# Patient Record
Sex: FEMALE | Race: WHITE | URBAN - METROPOLITAN AREA
[De-identification: names, ages, dates, MRNs, and addresses within clinical notes are randomized per-mention and may not be internally consistent; named-entity substitution may affect disease eponyms.]

---

## 2022-10-05 SDOH — HEALTH STABILITY: PHYSICAL HEALTH: ON AVERAGE, HOW MANY MINUTES DO YOU ENGAGE IN EXERCISE AT THIS LEVEL?: 10 MIN

## 2022-10-05 SDOH — HEALTH STABILITY: PHYSICAL HEALTH: ON AVERAGE, HOW MANY DAYS PER WEEK DO YOU ENGAGE IN MODERATE TO STRENUOUS EXERCISE (LIKE A BRISK WALK)?: 1 DAY

## 2022-10-05 ASSESSMENT — SOCIAL DETERMINANTS OF HEALTH (SDOH)
WITHIN THE LAST YEAR, HAVE YOU BEEN KICKED, HIT, SLAPPED, OR OTHERWISE PHYSICALLY HURT BY YOUR PARTNER OR EX-PARTNER?: NO
WITHIN THE LAST YEAR, HAVE YOU BEEN AFRAID OF YOUR PARTNER OR EX-PARTNER?: NO
WITHIN THE LAST YEAR, HAVE YOU BEEN HUMILIATED OR EMOTIONALLY ABUSED IN OTHER WAYS BY YOUR PARTNER OR EX-PARTNER?: NO
WITHIN THE LAST YEAR, HAVE TO BEEN RAPED OR FORCED TO HAVE ANY KIND OF SEXUAL ACTIVITY BY YOUR PARTNER OR EX-PARTNER?: NO

## 2022-10-06 ENCOUNTER — OFFICE VISIT (OUTPATIENT)
Dept: INTERNAL MEDICINE CLINIC | Age: 22
End: 2022-10-06
Payer: COMMERCIAL

## 2022-10-06 VITALS
DIASTOLIC BLOOD PRESSURE: 84 MMHG | HEART RATE: 92 BPM | WEIGHT: 138 LBS | BODY MASS INDEX: 25.4 KG/M2 | HEIGHT: 62 IN | SYSTOLIC BLOOD PRESSURE: 122 MMHG

## 2022-10-06 DIAGNOSIS — F90.0 ATTENTION DEFICIT HYPERACTIVITY DISORDER (ADHD), PREDOMINANTLY INATTENTIVE TYPE: ICD-10-CM

## 2022-10-06 DIAGNOSIS — F43.10 PTSD (POST-TRAUMATIC STRESS DISORDER): ICD-10-CM

## 2022-10-06 DIAGNOSIS — Z11.59 NEED FOR HEPATITIS C SCREENING TEST: ICD-10-CM

## 2022-10-06 DIAGNOSIS — J45.20 MILD INTERMITTENT ASTHMA WITHOUT COMPLICATION: ICD-10-CM

## 2022-10-06 DIAGNOSIS — R73.03 PREDIABETES: ICD-10-CM

## 2022-10-06 DIAGNOSIS — F43.23 ADJUSTMENT DISORDER WITH MIXED ANXIETY AND DEPRESSED MOOD: ICD-10-CM

## 2022-10-06 DIAGNOSIS — F84.5 ASPERGER'S DISORDER: ICD-10-CM

## 2022-10-06 DIAGNOSIS — Z53.1 REFUSAL OF BLOOD TRANSFUSIONS AS PATIENT IS JEHOVAH'S WITNESS: ICD-10-CM

## 2022-10-06 DIAGNOSIS — K90.9 MALABSORPTION OF IRON: ICD-10-CM

## 2022-10-06 DIAGNOSIS — F33.42 RECURRENT MAJOR DEPRESSIVE DISORDER, IN FULL REMISSION (HCC): ICD-10-CM

## 2022-10-06 DIAGNOSIS — D50.8 IRON DEFICIENCY ANEMIA SECONDARY TO INADEQUATE DIETARY IRON INTAKE: Primary | ICD-10-CM

## 2022-10-06 DIAGNOSIS — F84.0 AUTISM: ICD-10-CM

## 2022-10-06 DIAGNOSIS — F41.1 GENERALIZED ANXIETY DISORDER: ICD-10-CM

## 2022-10-06 DIAGNOSIS — Z11.4 SCREENING FOR HIV WITHOUT PRESENCE OF RISK FACTORS: ICD-10-CM

## 2022-10-06 PROBLEM — F32.9 MAJOR DEPRESSIVE DISORDER: Status: ACTIVE | Noted: 2022-10-06

## 2022-10-06 PROBLEM — J45.909 ASTHMA: Status: ACTIVE | Noted: 2022-10-06

## 2022-10-06 RX ORDER — ALBUTEROL SULFATE 90 UG/1
AEROSOL, METERED RESPIRATORY (INHALATION)
COMMUNITY

## 2022-10-06 RX ORDER — METHYLPHENIDATE HYDROCHLORIDE 18 MG/1
TABLET ORAL
COMMUNITY

## 2022-10-06 RX ORDER — BUSPIRONE HYDROCHLORIDE 15 MG/1
TABLET ORAL
COMMUNITY

## 2022-10-06 RX ORDER — VENLAFAXINE HYDROCHLORIDE 150 MG/1
CAPSULE, EXTENDED RELEASE ORAL
COMMUNITY

## 2022-10-06 RX ORDER — METHYLPHENIDATE HYDROCHLORIDE 10 MG/1
TABLET ORAL
COMMUNITY

## 2022-10-06 RX ORDER — OMEPRAZOLE 20 MG/1
CAPSULE, DELAYED RELEASE ORAL
COMMUNITY

## 2022-10-06 RX ORDER — VENLAFAXINE HYDROCHLORIDE 37.5 MG/1
TABLET, EXTENDED RELEASE ORAL
COMMUNITY

## 2022-10-06 SDOH — ECONOMIC STABILITY: FOOD INSECURITY: WITHIN THE PAST 12 MONTHS, YOU WORRIED THAT YOUR FOOD WOULD RUN OUT BEFORE YOU GOT MONEY TO BUY MORE.: NEVER TRUE

## 2022-10-06 SDOH — ECONOMIC STABILITY: FOOD INSECURITY: WITHIN THE PAST 12 MONTHS, THE FOOD YOU BOUGHT JUST DIDN'T LAST AND YOU DIDN'T HAVE MONEY TO GET MORE.: NEVER TRUE

## 2022-10-06 ASSESSMENT — ENCOUNTER SYMPTOMS
WHEEZING: 0
SHORTNESS OF BREATH: 0
COUGH: 0
CHEST TIGHTNESS: 0

## 2022-10-06 ASSESSMENT — PATIENT HEALTH QUESTIONNAIRE - PHQ9
8. MOVING OR SPEAKING SO SLOWLY THAT OTHER PEOPLE COULD HAVE NOTICED. OR THE OPPOSITE, BEING SO FIGETY OR RESTLESS THAT YOU HAVE BEEN MOVING AROUND A LOT MORE THAN USUAL: 1
7. TROUBLE CONCENTRATING ON THINGS, SUCH AS READING THE NEWSPAPER OR WATCHING TELEVISION: 2
4. FEELING TIRED OR HAVING LITTLE ENERGY: 3
2. FEELING DOWN, DEPRESSED OR HOPELESS: 2
6. FEELING BAD ABOUT YOURSELF - OR THAT YOU ARE A FAILURE OR HAVE LET YOURSELF OR YOUR FAMILY DOWN: 1
5. POOR APPETITE OR OVEREATING: 2
SUM OF ALL RESPONSES TO PHQ QUESTIONS 1-9: 15
SUM OF ALL RESPONSES TO PHQ9 QUESTIONS 1 & 2: 3
3. TROUBLE FALLING OR STAYING ASLEEP: 2
9. THOUGHTS THAT YOU WOULD BE BETTER OFF DEAD, OR OF HURTING YOURSELF: 1
SUM OF ALL RESPONSES TO PHQ QUESTIONS 1-9: 14
1. LITTLE INTEREST OR PLEASURE IN DOING THINGS: 1
SUM OF ALL RESPONSES TO PHQ QUESTIONS 1-9: 15
SUM OF ALL RESPONSES TO PHQ QUESTIONS 1-9: 15

## 2022-10-06 ASSESSMENT — SOCIAL DETERMINANTS OF HEALTH (SDOH): HOW HARD IS IT FOR YOU TO PAY FOR THE VERY BASICS LIKE FOOD, HOUSING, MEDICAL CARE, AND HEATING?: NOT HARD AT ALL
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
## 2022-10-07 DIAGNOSIS — K90.9 MALABSORPTION OF IRON: ICD-10-CM

## 2022-10-07 DIAGNOSIS — Z11.4 SCREENING FOR HIV WITHOUT PRESENCE OF RISK FACTORS: ICD-10-CM

## 2022-10-07 DIAGNOSIS — R73.03 PREDIABETES: ICD-10-CM

## 2022-10-07 DIAGNOSIS — Z11.59 NEED FOR HEPATITIS C SCREENING TEST: ICD-10-CM

## 2022-10-07 DIAGNOSIS — F41.1 GENERALIZED ANXIETY DISORDER: ICD-10-CM

## 2022-10-07 DIAGNOSIS — D50.8 IRON DEFICIENCY ANEMIA SECONDARY TO INADEQUATE DIETARY IRON INTAKE: ICD-10-CM

## 2022-10-07 LAB
ANION GAP SERPL CALCULATED.3IONS-SCNC: 11 MMOL/L (ref 3–16)
BUN BLDV-MCNC: 5 MG/DL (ref 7–20)
CALCIUM SERPL-MCNC: 9.7 MG/DL (ref 8.3–10.6)
CHLORIDE BLD-SCNC: 102 MMOL/L (ref 99–110)
CO2: 26 MMOL/L (ref 21–32)
CREAT SERPL-MCNC: 0.6 MG/DL (ref 0.6–1.1)
FERRITIN: 18.2 NG/ML (ref 15–150)
FOLATE: >20 NG/ML (ref 4.78–24.2)
GFR AFRICAN AMERICAN: >60
GFR NON-AFRICAN AMERICAN: >60
GLUCOSE BLD-MCNC: 90 MG/DL (ref 70–99)
HCT VFR BLD CALC: 43.4 % (ref 36–48)
HEMOGLOBIN: 14.3 G/DL (ref 12–16)
HEPATITIS C ANTIBODY INTERPRETATION: NORMAL
IRON SATURATION: 39 % (ref 15–50)
IRON: 131 UG/DL (ref 37–145)
MCH RBC QN AUTO: 29.6 PG (ref 26–34)
MCHC RBC AUTO-ENTMCNC: 32.9 G/DL (ref 31–36)
MCV RBC AUTO: 90.1 FL (ref 80–100)
PDW BLD-RTO: 13.5 % (ref 12.4–15.4)
PLATELET # BLD: 292 K/UL (ref 135–450)
PMV BLD AUTO: 8 FL (ref 5–10.5)
POTASSIUM SERPL-SCNC: 4 MMOL/L (ref 3.5–5.1)
RBC # BLD: 4.81 M/UL (ref 4–5.2)
SODIUM BLD-SCNC: 139 MMOL/L (ref 136–145)
TOTAL IRON BINDING CAPACITY: 335 UG/DL (ref 260–445)
TSH REFLEX FT4: 1.64 UIU/ML (ref 0.27–4.2)
VITAMIN B-12: 431 PG/ML (ref 211–911)
WBC # BLD: 4.8 K/UL (ref 4–11)

## 2022-10-08 LAB
ESTIMATED AVERAGE GLUCOSE: 111.2 MG/DL
HBA1C MFR BLD: 5.5 %
HIV AG/AB: NORMAL
HIV ANTIGEN: NORMAL
HIV-1 ANTIBODY: NORMAL
HIV-2 AB: NORMAL

## 2023-01-03 ENCOUNTER — HOSPITAL ENCOUNTER (EMERGENCY)
Age: 23
Discharge: HOME | End: 2023-01-03
Attending: EMERGENCY MEDICINE
Payer: COMMERCIAL

## 2023-01-03 ENCOUNTER — TELEPHONE (OUTPATIENT)
Dept: INTERNAL MEDICINE CLINIC | Age: 23
End: 2023-01-03

## 2023-01-03 VITALS
OXYGEN SATURATION: 99 % | WEIGHT: 138 LBS | TEMPERATURE: 98.8 F | HEART RATE: 96 BPM | SYSTOLIC BLOOD PRESSURE: 137 MMHG | HEIGHT: 62 IN | BODY MASS INDEX: 25.4 KG/M2 | DIASTOLIC BLOOD PRESSURE: 99 MMHG | RESPIRATION RATE: 16 BRPM

## 2023-01-03 DIAGNOSIS — R07.89 CHEST WALL PAIN: Primary | ICD-10-CM

## 2023-01-03 PROCEDURE — 93005 ELECTROCARDIOGRAM TRACING: CPT | Performed by: EMERGENCY MEDICINE

## 2023-01-03 PROCEDURE — 99283 EMERGENCY DEPT VISIT LOW MDM: CPT

## 2023-01-03 RX ORDER — CYCLOBENZAPRINE HCL 5 MG
5 TABLET ORAL 3 TIMES DAILY PRN
Qty: 30 TABLET | Refills: 0 | Status: SHIPPED | OUTPATIENT
Start: 2023-01-03 | End: 2023-01-13

## 2023-01-03 RX ORDER — IBUPROFEN 400 MG/1
400 TABLET ORAL EVERY 6 HOURS PRN
Qty: 30 TABLET | Refills: 0 | Status: SHIPPED | OUTPATIENT
Start: 2023-01-03

## 2023-01-03 ASSESSMENT — PAIN DESCRIPTION - LOCATION: LOCATION: CHEST

## 2023-01-03 ASSESSMENT — PAIN SCALES - GENERAL: PAINLEVEL_OUTOF10: 4

## 2023-01-03 ASSESSMENT — PAIN DESCRIPTION - PAIN TYPE: TYPE: ACUTE PAIN

## 2023-01-03 ASSESSMENT — PAIN - FUNCTIONAL ASSESSMENT: PAIN_FUNCTIONAL_ASSESSMENT: 0-10

## 2023-01-03 NOTE — TELEPHONE ENCOUNTER
Pt calling in because she woke up this morning with extreme chest pain, if she moves anything with her shoulders & further up it makes the pain worse. Feels better when sitting. Slight degree of pain of 5 when sitting & breathing. When moving around or taking a deep breath pain of degree feels like a 7. Advised pcp was not here & she should go to her nearest er or urgent.

## 2023-01-03 NOTE — ED PROVIDER NOTES
[facility]        Pt Name: Brandin Garcia  MRN: [id number]  [place] [address]  Date of evaluation: 1/3/2023  Provider: Vivienne Ayoub MD  PCP: DELIO Velázquez CNP  Note Started: 1:09 PM EST 1/3/23    CHIEF COMPLAINT       Chief Complaint   Patient presents with    Chest Pain     Patient sts she woke up with chest pain this morning up to shoulders. Hurts to breathe. Also endorses that her dog kicked in her chest yesterday. HISTORY OF PRESENT ILLNESS: 1 or more Elements     History from : Patient    Limitations to history : autism    Brandin Garcia is a 25 y.o. female who presents for chest pain. Patient reports that she has had chest pain since this morning. She states that her dog kicked her in the chest yesterday and scratched her chest and she did not feel pain until this morning. In response to her dog kicking her in the chest she however slept differently overnight making a nest for herself out of blankets and pillows and believes that this different sleeping because the chest pain. She denies any other complaints. No fevers, chills or sweats. She denies all PERC and cardiac risk factors    Nursing Notes were all reviewed and agreed with or any disagreements were addressed in the HPI. REVIEW OF SYSTEMS :      Review of Systems    Positives and Pertinent negatives as per HPI.      SURGICAL HISTORY     Past Surgical History:   Procedure Laterality Date    TOOTH EXTRACTION         CURRENTMEDICATIONS       Discharge Medication List as of 1/3/2023 12:53 PM        CONTINUE these medications which have NOT CHANGED    Details   albuterol sulfate HFA (PROVENTIL;VENTOLIN;PROAIR) 108 (90 Base) MCG/ACT inhaler albuterol sulfate HFA 90 mcg/actuation aerosol inhalerHistorical Med      busPIRone (BUSPAR) 15 MG tablet buspirone 15 mg tablet   TAKE 1 TABLET BY MOUTH EVERY DAYHistorical Med      methylphenidate (RITALIN) 10 MG tablet methylphenidate 10 mg tablet   TAKE 1 TABLET BY MOUTH ONCE A DAY WITH MEALS AT NOONHistorical Med      methylphenidate (CONCERTA) 18 MG extended release tablet methylphenidate ER 18 mg tablet,extended release 24 hr   TAKE 1 TABLET BY MOUTH EVERY MORNING WITH A MEALHistorical Med      omeprazole (PRILOSEC) 20 MG delayed release capsule omeprazole 20 mg capsule,delayed release   TAKE 1 CAPSULE BY MOUTH EVERY DAYHistorical Med      venlafaxine (EFFEXOR XR) 150 MG extended release capsule venlafaxine  mg capsule,extended release 24 hr   TAKE 1 CAPSULE BY MOUTH EVERY DAYHistorical Med      venlafaxine 37.5 MG extended release tablet venlafaxine ER 37.5 mg tablet,extended release 24 hr   TAKE 1 TABLET BY MOUTH ONCE A DAY WITH 150 MG TABLETHistorical Med      diphenhydrAMINE HCl (ALLERGY MED PO) Take by mouthHistorical Med      Multiple Vitamins-Minerals (WOMENS MULTIVITAMIN PO) Take by mouth Shakee womens with ironHistorical Med      ELDERBERRY PO Take by mouthHistorical Med             ALLERGIES     Pecan pollen, Citalopram, Other, and Amoxicillin    FAMILYHISTORY     No family history on file. SOCIAL HISTORY       Social History     Tobacco Use    Smoking status: Never    Smokeless tobacco: Never   Vaping Use    Vaping Use: Never used   Substance Use Topics    Alcohol use: Not Currently     Alcohol/week: 0.0 - 2.0 standard drinks     Comment: occassionally, 0-6 per month    Drug use: Never       SCREENINGS        Tory Coma Scale  Eye Opening: Spontaneous  Best Verbal Response: Oriented  Best Motor Response: Obeys commands  Correll Coma Scale Score: 15                CIWA Assessment  BP: (!) 137/99  Heart Rate: 96           PHYSICAL EXAM  1 or more Elements     ED Triage Vitals [01/03/23 1235]   BP Temp Temp src Heart Rate Resp SpO2 Height Weight   (!) 137/99 98.8 °F (37.1 °C) -- 96 16 99 % 5' 2\" (1.575 m) 138 lb (62.6 kg)       Physical Exam    On exam patient's anterior chest wall is tender.   No bruising noted.  Heart regular rate and rhythm and lungs clear to auscultation bilaterally. Abdomen benign. No significant peripheral edema, patient is awake and alert and oriented x3. Does not maintain eye contact. DIAGNOSTIC RESULTS   LABS:    Labs Reviewed - No data to display    When ordered only abnormal lab results are displayed. All other labs were within normal range or not returned as of this dictation. EKG: EKG  The Ekg interpreted by me shows  normal sinus rhythm with a rate of 90  Axis is   Right axis deviation  QTc is  normal  Intervals and Durations are unremarkable. ST Segments: normal  No prior EKG available for comparison. PAST MEDICAL HISTORY      has a past medical history of Cardiac syncope (8/27/2014). EMERGENCY DEPARTMENT COURSE and DIFFERENTIAL DIAGNOSIS/MDM:   Vitals:    Vitals:    01/03/23 1235   BP: (!) 137/99   Pulse: 96   Resp: 16   Temp: 98.8 °F (37.1 °C)   SpO2: 99%   Weight: 138 lb (62.6 kg)   Height: 5' 2\" (1.575 m)           Social Determinants : autism    Records Reviewed : None    CC/HPI Summary, DDx, ED Course, and Reassessment: Based on the patient's story I do not believe that she requires any further evaluation. Her pain is musculoskeletal both on exam and by history. ACS, PE and thoracic aortic dissection have all been ruled out in my opinion based on the exam and history. Patient is PERC criteria negative. Disposition Considerations (tests considered but not done, Shared Decision Making, Pt Expectation of Test or Tx.): We considered imaging and we are in agreement that she does not need this based on strong opinion that this is not needed. Patient is agreeable with plan. I am the Primary Clinician of Record. FINAL IMPRESSION      1.  Chest wall pain          DISPOSITION/PLAN     DISPOSITION Decision To Discharge 01/03/2023 12:48:13 PM      PATIENT REFERRED TO:  Deven Zapata, DELIO - CNP  [address] [address]  [phone]    Schedule an appointment as soon as possible for a visit in 2 days        DISCHARGE MEDICATIONS:  Discharge Medication List as of 1/3/2023 12:53 PM        START taking these medications    Details   ibuprofen (ADVIL;MOTRIN) 400 MG tablet Take 1 tablet by mouth every 6 hours as needed for Pain, Disp-30 tablet, R-0Normal      cyclobenzaprine (FLEXERIL) 5 MG tablet Take 1 tablet by mouth 3 times daily as needed for Muscle spasms, Disp-30 tablet, R-0Normal             DISCONTINUED MEDICATIONS:  Discharge Medication List as of 1/3/2023 12:53 PM                 (Please note that portions of this note were completed with a voice recognition program.  Efforts were made to edit the dictations but occasionally words are mis-transcribed.)    Troy Ferrer MD (electronically signed)           Troy Ferrer MD  01/03/23 2063

## 2023-01-05 LAB
EKG ATRIAL RATE: 90 BPM
EKG DIAGNOSIS: NORMAL
EKG P-R INTERVAL: 144 MS
EKG Q-T INTERVAL: 330 MS
EKG QRS DURATION: 82 MS
EKG QTC CALCULATION (BAZETT): 403 MS
EKG R AXIS: 113 DEGREES
EKG T AXIS: 154 DEGREES
EKG VENTRICULAR RATE: 90 BPM

## 2023-01-16 ENCOUNTER — OFFICE VISIT (OUTPATIENT)
Dept: INTERNAL MEDICINE CLINIC | Age: 23
End: 2023-01-16
Payer: COMMERCIAL

## 2023-01-16 VITALS
DIASTOLIC BLOOD PRESSURE: 78 MMHG | WEIGHT: 146 LBS | HEART RATE: 99 BPM | OXYGEN SATURATION: 99 % | BODY MASS INDEX: 26.7 KG/M2 | SYSTOLIC BLOOD PRESSURE: 130 MMHG

## 2023-01-16 DIAGNOSIS — F43.23 ADJUSTMENT DISORDER WITH MIXED ANXIETY AND DEPRESSED MOOD: ICD-10-CM

## 2023-01-16 DIAGNOSIS — F41.1 GENERALIZED ANXIETY DISORDER: ICD-10-CM

## 2023-01-16 DIAGNOSIS — R07.89 CHEST WALL PAIN: Primary | ICD-10-CM

## 2023-01-16 DIAGNOSIS — F90.0 ATTENTION DEFICIT HYPERACTIVITY DISORDER (ADHD), PREDOMINANTLY INATTENTIVE TYPE: ICD-10-CM

## 2023-01-16 DIAGNOSIS — S29.012D STRAIN OF RHOMBOID MUSCLE, SUBSEQUENT ENCOUNTER: ICD-10-CM

## 2023-01-16 DIAGNOSIS — F43.10 PTSD (POST-TRAUMATIC STRESS DISORDER): ICD-10-CM

## 2023-01-16 DIAGNOSIS — F84.5 ASPERGER'S DISORDER: ICD-10-CM

## 2023-01-16 DIAGNOSIS — F33.42 RECURRENT MAJOR DEPRESSIVE DISORDER, IN FULL REMISSION (HCC): ICD-10-CM

## 2023-01-16 DIAGNOSIS — F84.0 AUTISM: ICD-10-CM

## 2023-01-16 PROCEDURE — G8419 CALC BMI OUT NRM PARAM NOF/U: HCPCS | Performed by: NURSE PRACTITIONER

## 2023-01-16 PROCEDURE — 1036F TOBACCO NON-USER: CPT | Performed by: NURSE PRACTITIONER

## 2023-01-16 PROCEDURE — 99214 OFFICE O/P EST MOD 30 MIN: CPT | Performed by: NURSE PRACTITIONER

## 2023-01-16 PROCEDURE — G8484 FLU IMMUNIZE NO ADMIN: HCPCS | Performed by: NURSE PRACTITIONER

## 2023-01-16 PROCEDURE — G8427 DOCREV CUR MEDS BY ELIG CLIN: HCPCS | Performed by: NURSE PRACTITIONER

## 2023-01-16 ASSESSMENT — PATIENT HEALTH QUESTIONNAIRE - PHQ9
SUM OF ALL RESPONSES TO PHQ9 QUESTIONS 1 & 2: 2
10. IF YOU CHECKED OFF ANY PROBLEMS, HOW DIFFICULT HAVE THESE PROBLEMS MADE IT FOR YOU TO DO YOUR WORK, TAKE CARE OF THINGS AT HOME, OR GET ALONG WITH OTHER PEOPLE: 1
6. FEELING BAD ABOUT YOURSELF - OR THAT YOU ARE A FAILURE OR HAVE LET YOURSELF OR YOUR FAMILY DOWN: 1
9. THOUGHTS THAT YOU WOULD BE BETTER OFF DEAD, OR OF HURTING YOURSELF: 0
4. FEELING TIRED OR HAVING LITTLE ENERGY: 3
SUM OF ALL RESPONSES TO PHQ QUESTIONS 1-9: 12
1. LITTLE INTEREST OR PLEASURE IN DOING THINGS: 1
7. TROUBLE CONCENTRATING ON THINGS, SUCH AS READING THE NEWSPAPER OR WATCHING TELEVISION: 2
SUM OF ALL RESPONSES TO PHQ QUESTIONS 1-9: 12
3. TROUBLE FALLING OR STAYING ASLEEP: 2
2. FEELING DOWN, DEPRESSED OR HOPELESS: 1
8. MOVING OR SPEAKING SO SLOWLY THAT OTHER PEOPLE COULD HAVE NOTICED. OR THE OPPOSITE, BEING SO FIGETY OR RESTLESS THAT YOU HAVE BEEN MOVING AROUND A LOT MORE THAN USUAL: 1
5. POOR APPETITE OR OVEREATING: 1
SUM OF ALL RESPONSES TO PHQ QUESTIONS 1-9: 12
SUM OF ALL RESPONSES TO PHQ QUESTIONS 1-9: 12

## 2023-01-16 ASSESSMENT — ENCOUNTER SYMPTOMS
CHEST TIGHTNESS: 0
WHEEZING: 0
COUGH: 0
SHORTNESS OF BREATH: 0

## 2023-01-16 NOTE — PROGRESS NOTES
1/16/23     Chief Complaint   Patient presents with    ED Follow-up     [facility] 1/3/23 for Chest pain. Has improved. HPI    Here today for ED follow up from 1/3/2023. States she had a chest wall pain the day of 1/3/23. Of note dog kicked her in chest that day more in the right clavicle. She states she made a nest in her room that also made her sleep in an awkward position. The pain she describes was positional. She states it was hard to take a deep breath with this pain and she couldn't move. She denies feeling faint, nausea, cold sweat, jaw pain, or radiation of pain with the previous episode. She went to the ER with mom and she states in the waiting room the pain had already started to subside. The ED did an EKG that was WNL. She states she has not had any syncope episodes since 2014, she was evaluated by a one time visit to [facility], where an ECHO was performed but results were WNL. She states they told her it was mostly dehydration. She is not currently followed by cardiology. She states the pain has not returned and things have been normal since ED visit. She does state she has some other associated pains in her neck and shoulder blade which are positional when she turns her head to the right. She states she googled her pain symptoms and wants to know if it is a pinched nerve. Was sent home from the ED with ibuprofen and a muscle relaxer? ? (Flexeril) . She has used these medications when the pain is worse and she says they do help. Nothing seems to make the pain worse. She states all other aspects of her health are without concern. ADHD and Anxiety continues to be well controlled. She is starting a new job today at [employer].          Allergies   Allergen Reactions    Pecan Pollen Shortness Of Breath    Citalopram Other (See Comments)    Other      Other reaction(s): Tested positive  Pecan tree and hickory tree pollen    Amoxicillin Hives and Rash     Current Outpatient Medications   Medication Sig Dispense Refill    ibuprofen (ADVIL;MOTRIN) 400 MG tablet Take 1 tablet by mouth every 6 hours as needed for Pain 30 tablet 0    albuterol sulfate HFA (PROVENTIL;VENTOLIN;PROAIR) 108 (90 Base) MCG/ACT inhaler albuterol sulfate HFA 90 mcg/actuation aerosol inhaler      busPIRone (BUSPAR) 15 MG tablet Take 30 mg by mouth      methylphenidate (RITALIN) 10 MG tablet methylphenidate 10 mg tablet   TAKE 1 TABLET BY MOUTH ONCE A DAY WITH MEALS AT NOON      methylphenidate (CONCERTA) 18 MG extended release tablet methylphenidate ER 18 mg tablet,extended release 24 hr   TAKE 1 TABLET BY MOUTH EVERY MORNING WITH A MEAL      omeprazole (PRILOSEC) 20 MG delayed release capsule omeprazole 20 mg capsule,delayed release   TAKE 1 CAPSULE BY MOUTH EVERY DAY      venlafaxine (EFFEXOR XR) 150 MG extended release capsule venlafaxine  mg capsule,extended release 24 hr   TAKE 1 CAPSULE BY MOUTH EVERY DAY      venlafaxine 37.5 MG extended release tablet Take 250 mg by mouth daily (with breakfast)      diphenhydrAMINE HCl (ALLERGY MED PO) Take by mouth      Multiple Vitamins-Minerals (WOMENS MULTIVITAMIN PO) Take by mouth Shakee womens with iron      ELDERBERRY PO Take by mouth       No current facility-administered medications for this visit. Review of Systems   Constitutional:  Negative for chills, fatigue and fever. Respiratory:  Negative for cough, chest tightness, shortness of breath and wheezing. Cardiovascular:  Negative for chest pain, palpitations and leg swelling. Neurological:  Negative for dizziness, tremors, light-headedness and headaches. Vitals:    01/16/23 0812   BP: 130/78   Pulse: 99   SpO2: 99%   Weight: 146 lb (66.2 kg)      Physical Exam  Constitutional:       General: She is not in acute distress. Appearance: Normal appearance. She is not ill-appearing. HENT:      Head: Normocephalic and atraumatic. Pulmonary:      Effort: Pulmonary effort is normal. No respiratory distress. Neurological:      General: No focal deficit present. Mental Status: She is alert and oriented to person, place, and time. Mental status is at baseline. Psychiatric:         Mood and Affect: Mood normal.         Behavior: Behavior normal.     Assessment/Plan:  Chest wall pain/ rhomboid strain   MSK in nature. Chest wall pain has resolved. Mild and intermittent rhomboid strain. Continue current therapy with Ibuprofen prn if needed   Start at home stretches - provided written instructions     Generalized anxiety disorder/Autism/Asperger's disorder/Adjustment disorder with mixed anxiety and depressed mood/Recurrent major depressive disorder, in full remission (HCC)/ Attention deficit hyperactivity disorder (ADHD), predominantly inattentive type  Chronic stable controlled   Continue current medications, and therapies     Discussed medications with patient, who voiced understanding of their use and indications. All questions answered. Return in about 6 months (around 7/16/2023), or if symptoms worsen or fail to improve.       Electronically signed by DELIO Medina CNP on 1/16/2023 at 9:57 AM

## 2023-01-31 ENCOUNTER — OFFICE VISIT (OUTPATIENT)
Dept: INTERNAL MEDICINE CLINIC | Age: 23
End: 2023-01-31
Payer: COMMERCIAL

## 2023-01-31 VITALS
HEIGHT: 62 IN | WEIGHT: 147.2 LBS | BODY MASS INDEX: 27.09 KG/M2 | HEART RATE: 119 BPM | OXYGEN SATURATION: 99 % | SYSTOLIC BLOOD PRESSURE: 128 MMHG | DIASTOLIC BLOOD PRESSURE: 80 MMHG

## 2023-01-31 DIAGNOSIS — D24.1 FIBROADENOMA OF BREAST, RIGHT: ICD-10-CM

## 2023-01-31 DIAGNOSIS — N63.0 BREAST LUMP IN UPPER INNER QUADRANT: Primary | ICD-10-CM

## 2023-01-31 PROCEDURE — G8419 CALC BMI OUT NRM PARAM NOF/U: HCPCS | Performed by: INTERNAL MEDICINE

## 2023-01-31 PROCEDURE — G8427 DOCREV CUR MEDS BY ELIG CLIN: HCPCS | Performed by: INTERNAL MEDICINE

## 2023-01-31 PROCEDURE — 1036F TOBACCO NON-USER: CPT | Performed by: INTERNAL MEDICINE

## 2023-01-31 PROCEDURE — G8484 FLU IMMUNIZE NO ADMIN: HCPCS | Performed by: INTERNAL MEDICINE

## 2023-01-31 PROCEDURE — 99213 OFFICE O/P EST LOW 20 MIN: CPT | Performed by: INTERNAL MEDICINE

## 2023-01-31 ASSESSMENT — ENCOUNTER SYMPTOMS
CHEST TIGHTNESS: 0
SORE THROAT: 0
COUGH: 0
VOMITING: 0
BACK PAIN: 0
WHEEZING: 0
ABDOMINAL PAIN: 0
COLOR CHANGE: 0
NAUSEA: 0
CONSTIPATION: 0
SHORTNESS OF BREATH: 0

## 2023-01-31 NOTE — PROGRESS NOTES
ASSESSMENT/PLAN:  1. Breast lump in upper inner quadrant  Assessment & Plan:   Patient reassured lump highly suggestive and consistent of benign fibroadenoma over the breast, she is very anxious about the findings, explained benign nature of lesion, advised will check ultrasound to see if this is a cyst versus fibroadenoma, advised if radiology request mammogram  then will place order however given young age high breast tissue density will make mammogram unuseful. Further recommendations will be pending imaging studies. Can use as needed Tylenol for breast discomfort  Orders:  -     US BREAST LIMITED RIGHT; Future  2. Fibroadenoma of breast, right  -     US BREAST LIMITED RIGHT; Future    No follow-ups on file. SUBJECTIVE  HPI:   Patient seen for acute illness, accompanied with mother, she is very anxious after noticing a lump in her right breast 3 days ago. She started her period today. The lump cannot be tender with deep palpation but generally nonpainful. He is very concerned because she has family history of breast cancer with her paternal aunt just finished chemotherapy and another relative on father side with past history of breast cancer      Review of Systems   Constitutional:  Negative for activity change, appetite change and fatigue. HENT:  Negative for congestion, hearing loss, mouth sores and sore throat. Respiratory:  Negative for cough, chest tightness, shortness of breath and wheezing. Cardiovascular:  Negative for chest pain, palpitations and leg swelling. Gastrointestinal:  Negative for abdominal pain, constipation, nausea and vomiting. Genitourinary:  Negative for difficulty urinating, dysuria, frequency, hematuria and urgency. Musculoskeletal:  Negative for arthralgias, back pain, gait problem and joint swelling. Skin:  Negative for color change. Allergic/Immunologic: Negative for environmental allergies and immunocompromised state.    Neurological:  Negative for dizziness, light-headedness and headaches. Psychiatric/Behavioral:  Negative for behavioral problems and dysphoric mood. The patient is nervous/anxious. OBJECTIVE:    /80   Pulse (!) 119   Ht 5' 2\" (1.575 m)   Wt 147 lb 3.2 oz (66.8 kg)   SpO2 99%   BMI 26.92 kg/m²    Physical Exam  Vitals and nursing note reviewed. Constitutional:       General: She is not in acute distress. Appearance: She is normal weight. She is not toxic-appearing. HENT:      Head: Normocephalic. Chest:   Breasts:     Right: Mass present. No swelling, bleeding, inverted nipple, nipple discharge, skin change or tenderness. Left: No swelling, bleeding, inverted nipple, mass, nipple discharge, skin change or tenderness. Comments: Round marble size mobile lump felt rupper inner quadrant of right breast  Musculoskeletal:      Cervical back: Neck supple. Lymphadenopathy:      Cervical: No cervical adenopathy. Neurological:      Mental Status: She is alert. Electronically signed by Rosalba Serrato MD on 1/31/2023 at 4:09 PM.    This dictation was generated by voice recognition computer software. Although all attempts are made to edit the dictation for accuracy, there may be errors in the transcription that are not intended.

## 2023-01-31 NOTE — ASSESSMENT & PLAN NOTE
Patient reassured lump highly suggestive and consistent of benign fibroadenoma over the breast, she is very anxious about the findings, explained benign nature of lesion, advised will check ultrasound to see if this is a cyst versus fibroadenoma, advised if radiology request mammogram  then will place order however given young age high breast tissue density will make mammogram unuseful. Further recommendations will be pending imaging studies.   Can use as needed Tylenol for breast discomfort

## 2023-02-15 ENCOUNTER — HOSPITAL ENCOUNTER (OUTPATIENT)
Dept: ULTRASOUND IMAGING | Age: 23
Discharge: HOME | End: 2023-02-15
Payer: COMMERCIAL

## 2023-02-15 DIAGNOSIS — D24.1 FIBROADENOMA OF BREAST, RIGHT: ICD-10-CM

## 2023-02-15 DIAGNOSIS — N63.0 BREAST LUMP IN UPPER INNER QUADRANT: ICD-10-CM

## 2023-02-15 PROCEDURE — 76642 ULTRASOUND BREAST LIMITED: CPT

## 2023-03-28 ENCOUNTER — OFFICE VISIT (OUTPATIENT)
Dept: INTERNAL MEDICINE CLINIC | Age: 23
End: 2023-03-28
Payer: COMMERCIAL

## 2023-03-28 VITALS
HEIGHT: 62 IN | SYSTOLIC BLOOD PRESSURE: 120 MMHG | HEART RATE: 97 BPM | DIASTOLIC BLOOD PRESSURE: 76 MMHG | OXYGEN SATURATION: 98 % | BODY MASS INDEX: 27.68 KG/M2 | WEIGHT: 150.4 LBS

## 2023-03-28 DIAGNOSIS — H83.02 LABYRINTHITIS OF LEFT EAR: Primary | ICD-10-CM

## 2023-03-28 PROCEDURE — G8419 CALC BMI OUT NRM PARAM NOF/U: HCPCS | Performed by: INTERNAL MEDICINE

## 2023-03-28 PROCEDURE — 99213 OFFICE O/P EST LOW 20 MIN: CPT | Performed by: INTERNAL MEDICINE

## 2023-03-28 PROCEDURE — G8427 DOCREV CUR MEDS BY ELIG CLIN: HCPCS | Performed by: INTERNAL MEDICINE

## 2023-03-28 PROCEDURE — G8484 FLU IMMUNIZE NO ADMIN: HCPCS | Performed by: INTERNAL MEDICINE

## 2023-03-28 PROCEDURE — 1036F TOBACCO NON-USER: CPT | Performed by: INTERNAL MEDICINE

## 2023-03-28 SDOH — ECONOMIC STABILITY: FOOD INSECURITY: WITHIN THE PAST 12 MONTHS, THE FOOD YOU BOUGHT JUST DIDN'T LAST AND YOU DIDN'T HAVE MONEY TO GET MORE.: NEVER TRUE

## 2023-03-28 SDOH — ECONOMIC STABILITY: INCOME INSECURITY: HOW HARD IS IT FOR YOU TO PAY FOR THE VERY BASICS LIKE FOOD, HOUSING, MEDICAL CARE, AND HEATING?: NOT HARD AT ALL

## 2023-03-28 SDOH — ECONOMIC STABILITY: FOOD INSECURITY: WITHIN THE PAST 12 MONTHS, YOU WORRIED THAT YOUR FOOD WOULD RUN OUT BEFORE YOU GOT MONEY TO BUY MORE.: NEVER TRUE

## 2023-03-28 SDOH — ECONOMIC STABILITY: HOUSING INSECURITY
IN THE LAST 12 MONTHS, WAS THERE A TIME WHEN YOU DID NOT HAVE A STEADY PLACE TO SLEEP OR SLEPT IN A SHELTER (INCLUDING NOW)?: NO

## 2023-03-28 ASSESSMENT — ENCOUNTER SYMPTOMS
ABDOMINAL PAIN: 0
COUGH: 0
SWOLLEN GLANDS: 0
SORE THROAT: 0
VOMITING: 0
VISUAL CHANGE: 1

## 2023-07-17 ENCOUNTER — OFFICE VISIT (OUTPATIENT)
Dept: INTERNAL MEDICINE CLINIC | Age: 23
End: 2023-07-17
Payer: COMMERCIAL

## 2023-07-17 VITALS
OXYGEN SATURATION: 96 % | WEIGHT: 158 LBS | HEART RATE: 58 BPM | SYSTOLIC BLOOD PRESSURE: 114 MMHG | BODY MASS INDEX: 28.9 KG/M2 | DIASTOLIC BLOOD PRESSURE: 64 MMHG

## 2023-07-17 DIAGNOSIS — J45.20 MILD INTERMITTENT ASTHMA WITHOUT COMPLICATION: Primary | ICD-10-CM

## 2023-07-17 DIAGNOSIS — M25.50 ARTHRALGIA OF MULTIPLE JOINTS: ICD-10-CM

## 2023-07-17 DIAGNOSIS — D50.8 IRON DEFICIENCY ANEMIA SECONDARY TO INADEQUATE DIETARY IRON INTAKE: ICD-10-CM

## 2023-07-17 DIAGNOSIS — N94.6 DYSMENORRHEA: ICD-10-CM

## 2023-07-17 LAB
RHEUMATOID FACT SER IA-ACNC: <10 IU/ML
TSH SERPL DL<=0.005 MIU/L-ACNC: 1.9 UIU/ML (ref 0.27–4.2)
URATE SERPL-MCNC: 5.5 MG/DL (ref 2.6–6)

## 2023-07-17 PROCEDURE — 99214 OFFICE O/P EST MOD 30 MIN: CPT | Performed by: NURSE PRACTITIONER

## 2023-07-17 PROCEDURE — G8427 DOCREV CUR MEDS BY ELIG CLIN: HCPCS | Performed by: NURSE PRACTITIONER

## 2023-07-17 PROCEDURE — 1036F TOBACCO NON-USER: CPT | Performed by: NURSE PRACTITIONER

## 2023-07-17 PROCEDURE — G8419 CALC BMI OUT NRM PARAM NOF/U: HCPCS | Performed by: NURSE PRACTITIONER

## 2023-07-17 RX ORDER — BUDESONIDE AND FORMOTEROL FUMARATE DIHYDRATE 160; 4.5 UG/1; UG/1
2 AEROSOL RESPIRATORY (INHALATION) 2 TIMES DAILY
Qty: 30.6 G | Refills: 1 | Status: SHIPPED | OUTPATIENT
Start: 2023-07-17

## 2023-07-17 RX ORDER — ALBUTEROL SULFATE 90 UG/1
AEROSOL, METERED RESPIRATORY (INHALATION)
Qty: 18 G | Refills: 3 | Status: SHIPPED | OUTPATIENT
Start: 2023-07-17 | End: 2023-07-17

## 2023-07-17 RX ORDER — ALBUTEROL SULFATE 90 UG/1
2 AEROSOL, METERED RESPIRATORY (INHALATION) EVERY 6 HOURS PRN
Qty: 18 G | Refills: 3 | Status: SHIPPED | OUTPATIENT
Start: 2023-07-17

## 2023-07-17 ASSESSMENT — ENCOUNTER SYMPTOMS
WHEEZING: 0
COUGH: 0
CHEST TIGHTNESS: 0
SHORTNESS OF BREATH: 0

## 2023-07-17 NOTE — PROGRESS NOTES
uncontrolled. Refilling albuterol to use as needed. Restarting Symbicort twice daily for better control. Orders:  -     budesonide-formoterol (SYMBICORT) 160-4.5 MCG/ACT AERO; Inhale 2 puffs into the lungs 2 times daily, Disp-30.6 g, R-1Normal  2. Iron deficiency anemia secondary to inadequate dietary iron intake  Assessment & Plan:  Chronic, most recent cbc and iron studies are WNL   Orders:  -     Minerva Nicolas MD, Gynecology, [facility]  3. Dysmenorrhea  Assessment & Plan:  Chronic, uncontrolled. Referral for GYN  Orders:  -     Minerva Nicolsa MD, Gynecology, [facility]  4. Arthralgia of multiple joints  Assessment & Plan:  Chronic, intermittent. Encouraged patient to wear good supportive shoes when working and standing on her feet. We discussed in detail and she is not sure if there is a family history of autoimmune disorder/arthralgia. We will check blood work today. We discussed benefits of exercise and stretching. Referral for PT. Orders:  -     [address] [place], [place] [address]  -     Rheumatoid Factor; Future  -     Cyclic Citrul Peptide Antibody, IgG; Future  -     Uric Acid; Future  -     TSH; Future     Discussed medications with patient, who voiced understanding of their use and indications. All questions answered.     Return in about 6 months (around 1/17/2024), or if symptoms worsen or fail to improve, for annual exam .      Electronically signed by DELIO Centeno CNP on 7/17/2023 at 9:37 AM

## 2023-07-17 NOTE — ASSESSMENT & PLAN NOTE
Chronic, intermittent. Encouraged patient to wear good supportive shoes when working and standing on her feet. We discussed in detail and she is not sure if there is a family history of autoimmune disorder/arthralgia. We will check blood work today. We discussed benefits of exercise and stretching.   Referral for PT.

## 2023-07-17 NOTE — ASSESSMENT & PLAN NOTE
Chronic, uncontrolled. Refilling albuterol to use as needed. Restarting Symbicort twice daily for better control.

## 2023-07-18 LAB — CCP IGG SERPL-ACNC: <0.5 U/ML (ref 0–2.9)

## 2023-08-02 ENCOUNTER — HOSPITAL ENCOUNTER (OUTPATIENT)
Dept: PHYSICAL THERAPY | Age: 23
Setting detail: THERAPIES SERIES
Discharge: HOME | End: 2023-08-02
Payer: COMMERCIAL

## 2023-08-02 PROCEDURE — 97161 PT EVAL LOW COMPLEX 20 MIN: CPT

## 2023-08-02 PROCEDURE — 97530 THERAPEUTIC ACTIVITIES: CPT

## 2023-08-02 NOTE — PLAN OF CARE
litter box without increase in symptoms. [] Progressing: [] Met: [] Not Met: [] Adjusted  3. Patient will demonstrate increased Strength and core activation to allow for proper functional mobility as indicated by patients Functional Deficits to allow pt to resume work duties and household chores without increase in symptoms. [] Progressing: [] Met: [] Not Met: [] Adjusted  4. Patient will return to functional activities including playing with dogs without increased symptoms or restriction.    [] Progressing: [] Met: [] Not Met: [] Adjusted    Electronically signed by:  Cari Morales, PT, DPT, OMT-C  [id number]

## 2023-08-02 NOTE — FLOWSHEET NOTE
[facility]  Phone: [phone]   Fax: [phone]    Physical Therapy Daily Treatment Note    Date:  2023     Patient Name:  Sheyla Esparza    :  2000  MRN: [id number]  Medical Diagnosis:  Arthralgia of multiple joints [M25.50]  Treatment Diagnosis: limited function secondary to joint pain (multiple sites)  Insurance/Certification information:  PT Insurance Information: Jason Ying - medical necessity; no preauth  Physician Information:  DELIO Miller -CNP  Plan of care signed (Y/N): []  Yes [x]  No     Date of Patient follow up with Physician:      Progress Report: [x]  Yes - evaluation  []  No     Date Range for reporting period:  Beginnin2023  PN:  POC:  Discharge:     Progress report due (10 Rx/or 30 days whichever is less): visit #10 or       Recertification due (POC duration/ or 90 days whichever is less): visit #12 or 23      Visit # Insurance Allowable Auth required?  Date Range    [place] []  Yes    No[x]          Latex Allergy:  [x]NO      []YES  Preferred Language for Healthcare:   [x]English       []other:    Functional Scale:       Date assessed:  LEFS: raw score = 55; dysfunction = 32%  23    Pain level:  2-3/10     SUBJECTIVE:  See eval    OBJECTIVE: See eval      RESTRICTIONS/PRECAUTIONS: per pt report - autism, low energy     Exercises/Interventions:     Therapeutic Exercises (38290) Resistance / level Sets/sec Reps Notes   Nu step       IB/HR       Standing hamstring stretch       Standing hip flexor stretch                                          Therapeutic Activities (91904)       Multifidus walkouts       Mid rows  High rows  LPD       Lateral walks with band at thighs (ankles)              Gait (04867)                                   Neuromuscular Re-ed (83224)                                                 Manual Intervention (84450)

## 2023-08-09 ENCOUNTER — HOSPITAL ENCOUNTER (OUTPATIENT)
Dept: PHYSICAL THERAPY | Age: 23
Setting detail: THERAPIES SERIES
Discharge: HOME | End: 2023-08-09
Payer: COMMERCIAL

## 2023-08-09 NOTE — PROGRESS NOTES
Physical Therapy      [address] [place] [address]    Physical Therapy  Cancellation/No-show Note  Patient Name:  Peggy Anderson  :  2000   Date:  2023    Cancelled visits to date: 1  No-shows to date: 0    For today's appointment patient:  [x]  Cancelled 23  [x]  Rescheduled appointment  []  No-show     Reason given by patient:  []  Patient ill  []  Conflicting appointment  []  No transportation    []  Conflict with work  []  No reason given  [x]  Other:     Comments:  : pt arrived 12 minutes late for appt due to oversleeping. Offered to re-schedule pt today in my Admin spot since there wasn't enough time to see the patient in remaining time since she has multi-body area issues. Pt rescheduled for another day due to having to go to work by 3 pm    Phone call information:   []  Phone call made today to patient at _ time at number provided:      []  Patient answered, conversation as follows:    []  Patient did not answer, message left as follows:  []  Phone call not made today  [x]  Phone call not needed - pt contacted us to cancel and provided reason for cancellation. Electronically signed by:   Ruth Blanco, PT, PT

## 2023-08-12 ENCOUNTER — HOSPITAL ENCOUNTER (OUTPATIENT)
Dept: PHYSICAL THERAPY | Age: 23
Setting detail: THERAPIES SERIES
Discharge: HOME | End: 2023-08-12
Payer: COMMERCIAL

## 2023-08-12 PROCEDURE — 97530 THERAPEUTIC ACTIVITIES: CPT

## 2023-08-12 PROCEDURE — 97112 NEUROMUSCULAR REEDUCATION: CPT

## 2023-08-12 PROCEDURE — 97110 THERAPEUTIC EXERCISES: CPT

## 2023-08-12 NOTE — FLOWSHEET NOTE
soft tissue swelling/inflammation/restriction, improving soft tissue extensibility and allowing for proper ROM for normal function with   [] LE / lumbar: self care, mobility, lifting and ambulation. [] UE / Cervical: self care, reaching, carrying, lifting, house/yardwork, driving, computer work. Modalities:  [] (37316) Vasopneumatic compression: Utilized vasopneumatic compression to decrease edema / swelling for the purpose of improving mobility and quad tone / recruitment which will allow for increased overall function including but not limited to self-care, transfers, ambulation, and ascending / descending stairs. Charges:  Timed Code Treatment Minutes: 45   Total Treatment Minutes: 45     [] EVAL - LOW (08542)   [] EVAL - MOD (87730)  [] EVAL - HIGH (07594)  [] RE-EVAL (91832)  [x] PF(58271) x   1    [] Ionto  [x] NMR (27270) x   1    [] Vaso  [] Manual (98067) x       [] Ultrasound  [x] TA x  1     [] Mech Traction (82519)  [] Aquatic Therapy x     [] ES (un) (61391):   [] Home Management Training x  [] ES(attended) (87478)   [] Dry Needling 1-2 muscles (50485):  [] Dry Needling 3+ muscles (248287)  [] Group:      [] Other:     GOALS:   Short Term Goals: To be achieved in: 2 weeks  1. Independent in HEP and progression per patient tolerance, in order to prevent re-injury. [] Progressing: [] Met: [] Not Met: [] Adjusted  2. Patient will have a decrease in pain to facilitate improvement in movement, function, and ADLs as indicated by improvement with respect to Functional Deficits. [] Progressing: [] Met: [] Not Met: [] Adjusted     Long Term Goals: To be achieved in: 4-6 weeks  1. LEFS functional survey score improved by 9 points in order to demonstrate return to recreational activities and overall strength improvement  [] Progressing: [] Met: [] Not Met: [] Adjusted  2.  Patient will demonstrate increased AROM  to [facility] for UE's, LE's, and spine to allow for proper joint functioning to allow pt to

## 2023-08-15 ENCOUNTER — HOSPITAL ENCOUNTER (OUTPATIENT)
Dept: PHYSICAL THERAPY | Age: 23
Setting detail: THERAPIES SERIES
Discharge: HOME | End: 2023-08-15
Payer: COMMERCIAL

## 2023-08-15 PROCEDURE — 97530 THERAPEUTIC ACTIVITIES: CPT

## 2023-08-15 PROCEDURE — 97110 THERAPEUTIC EXERCISES: CPT

## 2023-08-15 PROCEDURE — 97112 NEUROMUSCULAR REEDUCATION: CPT

## 2023-08-15 NOTE — FLOWSHEET NOTE
and LE. Weakness noted B hips. Postural impairments likely contributing to symptoms. Will benefit from a general strengthening program for UE, LE and core as well as education on postural correction and proper body mechanics. Treatment/Activity Tolerance:  [x] Patient able to complete tx [] Patient limited by fatigue  [] Patient limited by pain  [] Patient limited by other medical complications  [] Other:     Prognosis: [x] Good [] Fair  [] Poor    Patient Requires Follow-up: [x] Yes  [] No    Plan for next treatment session: begin per training diary and progress as tolerated    PLAN: See eval. PT 2x / week for 4-6 weeks.    [] Continue per plan of care [] Alter current plan (see comments)  [x] Plan of care initiated [] Hold pending MD visit [] Discharge    Electronically signed by: Racquel Hay, PT, DPT    Note: If patient does not return for scheduled/ recommended follow up visits, this note will serve as a discharge from care along with most recent update on progress.   '

## 2023-08-17 ENCOUNTER — HOSPITAL ENCOUNTER (OUTPATIENT)
Dept: PHYSICAL THERAPY | Age: 23
Setting detail: THERAPIES SERIES
Discharge: HOME | End: 2023-08-17
Payer: COMMERCIAL

## 2023-08-17 PROCEDURE — 97112 NEUROMUSCULAR REEDUCATION: CPT

## 2023-08-17 PROCEDURE — 97110 THERAPEUTIC EXERCISES: CPT

## 2023-08-17 NOTE — FLOWSHEET NOTE
to need more education and NR for good postures without hyperext of stabilizing joints. 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 ROM is [place] [place] for both UE and LE. Weakness noted B hips. Postural impairments likely contributing to symptoms. Will benefit from a general strengthening program for UE, LE and core as well as education on postural correction and proper body mechanics. Treatment/Activity Tolerance:  [x] Patient able to complete tx [] Patient limited by fatigue  [] Patient limited by pain  [] Patient limited by other medical complications  [] Other:     Prognosis: [x] Good [] Fair  [] Poor    Patient Requires Follow-up: [x] Yes  [] No    Plan for next treatment session: begin per training diary and progress as tolerated    PLAN: See abdirahman PT 2x / week for 4-6 weeks.    [x] Continue per plan of care [] Alter current plan (see comments)  [] Plan of care initiated [] Hold pending MD visit [] Discharge    Electronically signed by: Nancy Thibodeaux, PT, DPT, OMT-C    Note: If patient does not return for scheduled/ recommended follow up visits, this note will serve as a discharge from care along with most recent update on progress.   '

## 2023-08-21 ENCOUNTER — APPOINTMENT (OUTPATIENT)
Dept: PHYSICAL THERAPY | Age: 23
End: 2023-08-21
Payer: COMMERCIAL

## 2023-08-22 ENCOUNTER — HOSPITAL ENCOUNTER (OUTPATIENT)
Dept: PHYSICAL THERAPY | Age: 23
Setting detail: THERAPIES SERIES
Discharge: HOME | End: 2023-08-22
Payer: COMMERCIAL

## 2023-08-22 PROCEDURE — 97530 THERAPEUTIC ACTIVITIES: CPT

## 2023-08-22 PROCEDURE — 97112 NEUROMUSCULAR REEDUCATION: CPT

## 2023-08-22 PROCEDURE — 97110 THERAPEUTIC EXERCISES: CPT

## 2023-08-22 NOTE — FLOWSHEET NOTE
bending   [x]ADLs []Reaching  [x]Lifting  [x]Housework  []Driving [x]Job related tasks  [x]Sports/Recreation []Other:        ASSESSMENT:  8/22 progressing well. Improving awareness of how to move and hold herself without hyperext multiple joints. Cont to need increased overall joint stability and increased postural awareness/strength    . 8/17:  Pt tolerated session well this date; limited as she was 15 min late. Reports some compliance with HEP and states she feels therapy is \"helping a little\". Advanced strengthening and postural exercises today and pt tolerated well. Will continue to progress. 8/15   Pt has difficulty with strengthening against gravity, displays shaking of mm. Pt has poor follow through with HEP though she is interested in adding exercises. Cont to need more education and NR for good postures without hyperext of stabilizing joints. 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 [place] is [place] [place] for both UE and LE. Weakness noted B hips. Postural impairments likely contributing to symptoms. Will benefit from a general strengthening program for UE, LE and core as well as education on postural correction and proper body mechanics. Treatment/Activity Tolerance:  [x] Patient able to complete tx [] Patient limited by fatigue  [] Patient limited by pain  [] Patient limited by other medical complications  [] Other:     Prognosis: [x] Good [] Fair  [] Poor    Patient Requires Follow-up: [x] Yes  [] No    Plan for next treatment session: begin per training diary and progress as tolerated    PLAN: See eval. PT 2x / week for 4-6 weeks.    [x] Continue per plan of care [] Alter current plan (see comments)  [] Plan of care initiated [] Hold pending MD visit [] Discharge    Electronically signed by: Denise Stoddard, PT, DPT, OMT-C    Note: If patient does not return for scheduled/

## 2023-08-24 ENCOUNTER — HOSPITAL ENCOUNTER (OUTPATIENT)
Dept: PHYSICAL THERAPY | Age: 23
Setting detail: THERAPIES SERIES
Discharge: HOME | End: 2023-08-24
Payer: COMMERCIAL

## 2023-08-24 PROCEDURE — 97110 THERAPEUTIC EXERCISES: CPT

## 2023-08-24 PROCEDURE — 97530 THERAPEUTIC ACTIVITIES: CPT

## 2023-08-24 NOTE — FLOWSHEET NOTE
will have a decrease in pain to facilitate improvement in movement, function, and ADLs as indicated by improvement with respect to Functional Deficits. [] Progressing: [] Met: [] Not Met: [] Adjusted     Long Term Goals: To be achieved in: 4-6 weeks  1. LEFS functional survey score improved by 9 points in order to demonstrate return to recreational activities and overall strength improvement  [] Progressing: [] Met: [] Not Met: [] Adjusted  2. Patient will demonstrate increased AROM  to [facility] for UE's, LE's, and spine to allow for proper joint functioning to allow pt to resume emptying the litter box without increase in symptoms. [] Progressing: [] Met: [] Not Met: [] Adjusted  3. Patient will demonstrate increased Strength and core activation to allow for proper functional mobility as indicated by patients Functional Deficits to allow pt to resume work duties and household chores without increase in symptoms. [] Progressing: [] Met: [] Not Met: [] Adjusted  4. Patient will return to functional activities including playing with dogs without increased symptoms or restriction. [] Progressing: [] Met: [] Not Met: [] Adjusted    Overall Progression Towards Functional goals/ Treatment Progress Update:  [] Patient is progressing as expected towards functional goals listed. [] Progression is slowed due to complexities/Impairments listed. [] Progression has been slowed due to co-morbidities.   [x] Plan just implemented, too soon to assess goals progression <30days   [] Goals require adjustment due to lack of progress  [] Patient is not progressing as expected and requires additional follow up with physician  [] Other    Persisting Functional Limitations/Impairments:  []Sleeping []Sitting               [x]Standing []Transfers        []Walking []Kneeling               []Stairs [x]Squatting / bending   [x]ADLs []Reaching  [x]Lifting  [x]Housework  []Driving [x]Job related

## 2023-08-30 ENCOUNTER — HOSPITAL ENCOUNTER (OUTPATIENT)
Dept: PHYSICAL THERAPY | Age: 23
Setting detail: THERAPIES SERIES
Discharge: HOME | End: 2023-08-30
Payer: COMMERCIAL

## 2023-08-30 PROCEDURE — 97530 THERAPEUTIC ACTIVITIES: CPT

## 2023-08-30 PROCEDURE — 97110 THERAPEUTIC EXERCISES: CPT

## 2023-08-30 NOTE — FLOWSHEET NOTE
demonstrate return to recreational activities and overall strength improvement  [] Progressing: [] Met: [] Not Met: [] Adjusted  2. Patient will demonstrate increased AROM  to [facility] for UE's, LE's, and spine to allow for proper joint functioning to allow pt to resume emptying the litter box without increase in symptoms. [] Progressing: [] Met: [] Not Met: [] Adjusted  3. Patient will demonstrate increased Strength and core activation to allow for proper functional mobility as indicated by patients Functional Deficits to allow pt to resume work duties and household chores without increase in symptoms. [] Progressing: [] Met: [] Not Met: [] Adjusted  4. Patient will return to functional activities including playing with dogs without increased symptoms or restriction. [] Progressing: [] Met: [] Not Met: [] Adjusted    Overall Progression Towards Functional goals/ Treatment Progress Update:  [x] Patient is progressing as expected towards functional goals listed. [] Progression is slowed due to complexities/Impairments listed. [] Progression has been slowed due to co-morbidities. [] Plan just implemented, too soon to assess goals progression <30days   [] Goals require adjustment due to lack of progress  [] Patient is not progressing as expected and requires additional follow up with physician  [] Other    Persisting Functional Limitations/Impairments:  []Sleeping []Sitting               [x]Standing []Transfers        []Walking []Kneeling               []Stairs [x]Squatting / bending   [x]ADLs []Reaching  [x]Lifting  [x]Housework  []Driving [x]Job related tasks  [x]Sports/Recreation []Other:        ASSESSMENT:  8/29:  Pt progressing well overall; tolerating increased exercises in clinic and reports she is feeling some better at home and at work. Tolerated increased weight and reps with exercises this date. Will continue to progress and advance as tolerated.       . 8/17:  Pt tolerated session well this date;

## 2023-09-06 ENCOUNTER — HOSPITAL ENCOUNTER (OUTPATIENT)
Dept: PHYSICAL THERAPY | Age: 23
Setting detail: THERAPIES SERIES
Discharge: HOME | End: 2023-09-06

## 2023-09-06 NOTE — PROGRESS NOTES
Physical Therapy      [address] [place] [address]    Physical Therapy  Cancellation/No-show Note  Patient Name:  Nasir Hyde  :  2000   Date:  2023    Cancelled visits to date: 1  No-shows to date: 1    For today's appointment patient:  []  Cancelled 23  []  Rescheduled appointment  [x]  No-show      Reason given by patient:  []  Patient ill  []  Conflicting appointment  []  No transportation    []  Conflict with work  []  No reason given  []  Other:     Comments:     Phone call information:   [x]  Phone call made today to patient at time at number provided:      []  Patient answered, conversation as follows:    [x]  Patient did not answer, message left as follows: Advised pt of missed appt and of date and time of her next appt. Asked that she call the  if she is unable to make it to that appt. []  Phone call not made today  []  Phone call not needed - pt contacted us to cancel and provided reason for cancellation.      Electronically signed by:  Baudilio Thomas, PT, DPT, OMT-C

## 2023-09-12 ENCOUNTER — HOSPITAL ENCOUNTER (OUTPATIENT)
Dept: PHYSICAL THERAPY | Age: 23
Setting detail: THERAPIES SERIES
Discharge: HOME | End: 2023-09-12
Payer: COMMERCIAL

## 2023-09-12 PROCEDURE — 97110 THERAPEUTIC EXERCISES: CPT

## 2023-09-12 PROCEDURE — 97530 THERAPEUTIC ACTIVITIES: CPT

## 2023-09-12 NOTE — FLOWSHEET NOTE
[facility]  Phone: [phone]   Fax: [phone]  Physical Therapy Re-Certification Plan of Care    Dear DELIO Phillips CNP,    We had the pleasure of treating the following patient for physical therapy services at [facility]. A summary of our findings can be found in the updated assessment below. This includes our plan of care. If you have any questions or concerns regarding these findings, please do not hesitate to contact me at the office phone number checked above. Thank you for the referral.     Physician Signature:________________________________Date:__________________  By signing above (or electronic signature), therapist's plan is approved by physician      Functional Outcome:   LEFS:  raw score:  64       20% disability      Overall Response to Treatment:   [x]Patient is responding well to treatment and improvement is noted with regards  to goals   []Patient should continue to improve in reasonable time if they continue HEP   []Patient has plateaued and is no longer responding to skilled PT intervention    []Patient is getting worse and would benefit from return to referring MD   []Patient unable to adhere to initial POC   []Other:     Date range of Visits: 23 - 23  Total Visits: 7    Recommendation:    [x]Continue PT 2x / wk for 3 weeks.                []Hold PT, pending MD visit       Physical Therapy Daily Treatment Note    Date:  2023     Patient Name:  Bryan Ko    :  2000  MRN: [id number]  Medical Diagnosis:  Arthralgia of multiple joints [M25.50]  Treatment Diagnosis: limited function secondary to joint pain (multiple sites)  Insurance/Certification information:  PT Insurance Information: So Gustafson - medical necessity; no preauth  Physician Information:  DELIO Phillips  Plan of care signed (Y/N): []  Yes [x]  No     Date of

## 2023-09-14 ENCOUNTER — HOSPITAL ENCOUNTER (OUTPATIENT)
Dept: PHYSICAL THERAPY | Age: 23
Setting detail: THERAPIES SERIES
Discharge: HOME | End: 2023-09-14
Payer: COMMERCIAL

## 2023-09-14 NOTE — PROGRESS NOTES
Physical Therapy      [address] [place] [address]    Physical Therapy  Cancellation/No-show Note  Patient Name:  Neto Bonilla  :  2000   Date:  2023    Cancelled visits to date: 2  No-shows to date: 1    For today's appointment patient:  [x]  Cancelled 23,   []  Rescheduled appointment  []  No-show      Reason given by patient:  [x]  Patient ill  []  Conflicting appointment  []  No transportation    []  Conflict with work  []  No reason given  []  Other:     Comments:     Phone call information:   []  Phone call made today to patient at time at number provided:      []  Patient answered, conversation as follows:    []  Patient did not answer, message left as follows: Advised pt of missed appt and of date and time of her next appt. Asked that she call the  if she is unable to make it to that appt. []  Phone call not made today  [x]  Phone call not needed - pt contacted us to cancel and provided reason for cancellation.      Electronically signed by:  Keith Egan, PT, DPT, OMT-C

## 2023-09-21 ENCOUNTER — HOSPITAL ENCOUNTER (OUTPATIENT)
Dept: PHYSICAL THERAPY | Age: 23
Setting detail: THERAPIES SERIES
Discharge: HOME | End: 2023-09-21
Payer: COMMERCIAL

## 2023-09-21 PROCEDURE — 97112 NEUROMUSCULAR REEDUCATION: CPT

## 2023-09-21 PROCEDURE — 97110 THERAPEUTIC EXERCISES: CPT

## 2023-09-21 PROCEDURE — 97530 THERAPEUTIC ACTIVITIES: CPT

## 2023-09-21 NOTE — FLOWSHEET NOTE
co-morbidities. [] Plan just implemented, too soon to assess goals progression <30days   [] Goals require adjustment due to lack of progress  [] Patient is not progressing as expected and requires additional follow up with physician  [] Other    Persisting Functional Limitations/Impairments:  []Sleeping []Sitting               [x]Standing []Transfers        []Walking []Kneeling               []Stairs [x]Squatting / bending   [x]ADLs []Reaching  [x]Lifting  [x]Housework  []Driving [x]Job related tasks  [x]Sports/Recreation []Other:        ASSESSMENT:  9/21 improving ability to maintain neutral spinal alignment and knee posture t/o session. Pt has difficulty doing HEP regularly and will benefit from more consistent performance    9/12:  Pt is progressing well overall; decreased reports of pain and is tolerating workday well. Increased exercise tolerance noted in clinic; increased reps and weight. Pt feels she is making good gains and would continue to benefit from skilled PT. Will continue to progress and advance as tolerated. Treatment/Activity Tolerance:  [x] Patient able to complete tx [] Patient limited by fatigue  [] Patient limited by pain  [] Patient limited by other medical complications  [] Other:     Prognosis: [x] Good [] Fair  [] Poor    Patient Requires Follow-up: [x] Yes  [] No    Plan for next treatment session:  per training diary and progress as tolerated    PLAN: See julius. PT 2x / week for 4-6 weeks.    [x] Continue per plan of care [] Alter current plan (see comments)  [] Plan of care initiated [] Hold pending MD visit [] Discharge    Electronically signed by: Conrado Juárez, PT, DPT, OMT-C    Note: If patient does not return for scheduled/ recommended follow up visits, this note will serve as a discharge from care along with most recent update on progress.   '

## 2023-09-23 ENCOUNTER — APPOINTMENT (OUTPATIENT)
Dept: PHYSICAL THERAPY | Age: 23
End: 2023-09-23
Payer: COMMERCIAL

## 2023-09-26 ENCOUNTER — HOSPITAL ENCOUNTER (OUTPATIENT)
Dept: PHYSICAL THERAPY | Age: 23
Setting detail: THERAPIES SERIES
Discharge: HOME | End: 2023-09-26
Payer: COMMERCIAL

## 2023-09-26 NOTE — PROGRESS NOTES
Physical Therapy      [address] [place] [address]    Physical Therapy  Cancellation/No-show Note  Patient Name:  Juan Luis Gerard  :  2000   Date:  2023    Cancelled visits to date: 2  No-shows to date: 1    For today's appointment patient:  []  Cancelled 23, ,   []  Rescheduled appointment  [x]  No-show ,      Reason given by patient:  []  Patient ill  []  Conflicting appointment  []  No transportation    []  Conflict with work  [x]  No reason given  []  Other:     Comments:     Phone call information:   [x]  Phone call made today to patient at time at number provided:      []  Patient answered, conversation as follows:    [x]  Patient did not answer, message left as follows: Advised pt of missed appt and of date and time of her next appt. Asked that she call the  if she is unable to make it to that appt. []  Phone call not made today  []  Phone call not needed - pt contacted us to cancel and provided reason for cancellation.      Electronically signed by:  Smiley Calderón, PT, DPT, OMT-C

## 2023-09-28 ENCOUNTER — HOSPITAL ENCOUNTER (OUTPATIENT)
Dept: PHYSICAL THERAPY | Age: 23
Setting detail: THERAPIES SERIES
Discharge: HOME | End: 2023-09-28
Payer: COMMERCIAL

## 2023-09-28 DIAGNOSIS — I89.0 LYMPHEDEMA OF ARM: Primary | ICD-10-CM

## 2023-09-28 DIAGNOSIS — I89.0 LYMPHEDEMA OF BREAST: ICD-10-CM

## 2023-09-28 PROCEDURE — 97110 THERAPEUTIC EXERCISES: CPT

## 2023-09-28 PROCEDURE — 97112 NEUROMUSCULAR REEDUCATION: CPT

## 2023-09-28 PROCEDURE — 97530 THERAPEUTIC ACTIVITIES: CPT

## 2023-09-28 NOTE — FLOWSHEET NOTE
care, reaching, carrying, lifting, house/yardwork, driving, computer work. Modalities:  [] (29810) Vasopneumatic compression: Utilized vasopneumatic compression to decrease edema / swelling for the purpose of improving mobility and quad tone / recruitment which will allow for increased overall function including but not limited to self-care, transfers, ambulation, and ascending / descending stairs. Charges:  Timed Code Treatment Minutes: 40   Total Treatment Minutes: 40     [] EVAL - LOW (90840)   [] EVAL - MOD (11928)  [] EVAL - HIGH (06080)  [] RE-EVAL (11298)  [x] PI(66889) x   1    [] Ionto  [x] NMR (53952) x   1    [] Vaso  [] Manual (18068) x       [] Ultrasound  [x] TA x 1     [] Mech Traction (23730)  [] Aquatic Therapy x     [] ES (un) (64512):   [] Home Management Training x  [] ES(attended) (38317)   [] Dry Needling 1-2 muscles (54838):  [] Dry Needling 3+ muscles (461665)  [] Group:      [] Other:     GOALS:   Short Term Goals: To be achieved in: 2 weeks  1. Independent in HEP and progression per patient tolerance, in order to prevent re-injury. [] Progressing: [x] Met: [] Not Met: [] Adjusted  2. Patient will have a decrease in pain to facilitate improvement in movement, function, and ADLs as indicated by improvement with respect to Functional Deficits. [] Progressing: [x] Met: [] Not Met: [] Adjusted     Long Term Goals: To be achieved in: 4-6 weeks  1. LEFS functional survey score improved by 9 points in order to demonstrate return to recreational activities and overall strength improvement  [] Progressing: [x] Met: [] Not Met: [] Adjusted  2. Patient will demonstrate increased AROM  to [facility] for UE's, LE's, and spine to allow for proper joint functioning to allow pt to resume emptying the litter box without increase in symptoms. [] Progressing: [x] Met: [] Not Met: [] Adjusted  3.  Patient will demonstrate increased Strength and core activation to allow for proper functional mobility as

## 2023-10-03 ENCOUNTER — HOSPITAL ENCOUNTER (OUTPATIENT)
Dept: PHYSICAL THERAPY | Age: 23
Setting detail: THERAPIES SERIES
Discharge: HOME | End: 2023-10-03

## 2023-10-03 NOTE — PROGRESS NOTES
Physical Therapy      [address] [place] [address]    Physical Therapy  Cancellation/No-show Note  Patient Name:  Tammy Bynum  :  2000   Date:  10/3/2023    Cancelled visits to date: 2  No-shows to date: 3    For today's appointment patient:  []  Cancelled 23, ,   []  Rescheduled appointment  [x]  No-show , , 10/3     Reason given by patient:  []  Patient ill  []  Conflicting appointment  []  No transportation    []  Conflict with work  [x]  No reason given  []  Other:     Comments:     Phone call information:   [x]  Phone call made today to patient at time at number provided:      []  Patient answered, conversation as follows:    [x]  Patient did not answer, message left as follows: Advised pt of missed appt and of date and time of her next appt. Asked that she call the  if she is unable to make it to that appt. []  Phone call not made today  []  Phone call not needed - pt contacted us to cancel and provided reason for cancellation.      Electronically signed by:  Kim Rogers, PT, DPT, OMT-C

## 2023-10-05 ENCOUNTER — APPOINTMENT (OUTPATIENT)
Dept: PHYSICAL THERAPY | Age: 23
End: 2023-10-05
Payer: COMMERCIAL

## 2023-10-10 ENCOUNTER — HOSPITAL ENCOUNTER (OUTPATIENT)
Dept: PHYSICAL THERAPY | Age: 23
Setting detail: THERAPIES SERIES
Discharge: HOME | End: 2023-10-10
Payer: COMMERCIAL

## 2023-10-10 PROCEDURE — 97110 THERAPEUTIC EXERCISES: CPT

## 2023-10-10 PROCEDURE — 97530 THERAPEUTIC ACTIVITIES: CPT

## 2023-10-10 NOTE — FLOWSHEET NOTE
adaptive equipment, including bathing, grooming, dressing, personal hygiene, basic household cleaning and chores. Home Exercise Program:    [x] (12868) Reviewed/Progressed HEP activities related to strengthening, flexibility, endurance, ROM of   [x] LE / Lumbar: core, proximal hip and LE for functional self-care, mobility, lifting and ambulation/stair navigation   [x] UE / Cervical: cervical, postural, scapular, scapulothoracic and UE control with self care, reaching, carrying, lifting, house/yardwork, driving, computer work  [] (71651)Reviewed/Progressed HEP activities related to improving balance, coordination, kinesthetic sense, posture, motor skill, proprioception of   [] LE: core, proximal hip and LE for self care, mobility, lifting, and ambulation/stair navigation    [] UE / Cervical: cervical, postural,  scapular, scapulothoracic and UE control with self care, reaching, carrying, lifting, house/yardwork, driving, computer work    Manual Treatments:  PROM / STM / Oscillations-Mobs:  G-I, II, III, IV (PA's, Inf., Post.)  [] (38115) Provided manual therapy to mobilize LE, proximal hip and/or LS spine soft tissue/joints for the purpose of modulating pain, promoting relaxation,  increasing ROM, reducing/eliminating soft tissue swelling/inflammation/restriction, improving soft tissue extensibility and allowing for proper ROM for normal function with   [] LE / lumbar: self care, mobility, lifting and ambulation. [] UE / Cervical: self care, reaching, carrying, lifting, house/yardwork, driving, computer work. Modalities:  [] (86048) Vasopneumatic compression: Utilized vasopneumatic compression to decrease edema / swelling for the purpose of improving mobility and quad tone / recruitment which will allow for increased overall function including but not limited to self-care, transfers, ambulation, and ascending / descending stairs.        Charges:  Timed Code Treatment Minutes: 45   Total Treatment Minutes:

## 2023-10-12 ENCOUNTER — HOSPITAL ENCOUNTER (OUTPATIENT)
Dept: PHYSICAL THERAPY | Age: 23
Setting detail: THERAPIES SERIES
Discharge: HOME | End: 2023-10-12
Payer: COMMERCIAL

## 2023-10-12 PROCEDURE — 97530 THERAPEUTIC ACTIVITIES: CPT

## 2023-10-12 PROCEDURE — 97110 THERAPEUTIC EXERCISES: CPT

## 2023-10-12 PROCEDURE — 97112 NEUROMUSCULAR REEDUCATION: CPT

## 2023-10-12 NOTE — FLOWSHEET NOTE
swelling/inflammation/restriction, improving soft tissue extensibility and allowing for proper ROM for normal function with   [] LE / lumbar: self care, mobility, lifting and ambulation. [] UE / Cervical: self care, reaching, carrying, lifting, house/yardwork, driving, computer work. Modalities:  [] (70683) Vasopneumatic compression: Utilized vasopneumatic compression to decrease edema / swelling for the purpose of improving mobility and quad tone / recruitment which will allow for increased overall function including but not limited to self-care, transfers, ambulation, and ascending / descending stairs. Charges:  Timed Code Treatment Minutes: 50   Total Treatment Minutes: 50     [] EVAL - LOW (31302)   [] EVAL - MOD (93523)  [] EVAL - HIGH (78616)  [] RE-EVAL (99920)  [x] KX(13711) x   1    [] Ionto  [x] NMR (32871) x   1    [] Vaso  [] Manual (94816) x       [] Ultrasound  [x] TA x 1     [] Mech Traction (36524)  [] Aquatic Therapy x     [] ES (un) (55854):   [] Home Management Training x  [] ES(attended) (24044)   [] Dry Needling 1-2 muscles (01471):  [] Dry Needling 3+ muscles (310052)  [] Group:      [] Other:     GOALS:   Short Term Goals: To be achieved in: 2 weeks  1. Independent in HEP and progression per patient tolerance, in order to prevent re-injury. [] Progressing: [x] Met: [] Not Met: [] Adjusted  2. Patient will have a decrease in pain to facilitate improvement in movement, function, and ADLs as indicated by improvement with respect to Functional Deficits. [] Progressing: [x] Met: [] Not Met: [] Adjusted     Long Term Goals: To be achieved in: 4-6 weeks  1. LEFS functional survey score improved by 9 points in order to demonstrate return to recreational activities and overall strength improvement  [] Progressing: [x] Met: [] Not Met: [] Adjusted  2.  Patient will demonstrate increased AROM  to [facility] for UE's, LE's, and spine to allow for proper joint functioning to allow pt to resume

## 2023-10-17 ENCOUNTER — HOSPITAL ENCOUNTER (OUTPATIENT)
Dept: PHYSICAL THERAPY | Age: 23
Setting detail: THERAPIES SERIES
Discharge: HOME | End: 2023-10-17
Payer: COMMERCIAL

## 2023-10-17 PROCEDURE — 97530 THERAPEUTIC ACTIVITIES: CPT

## 2023-10-17 PROCEDURE — 97110 THERAPEUTIC EXERCISES: CPT

## 2023-10-17 PROCEDURE — 97112 NEUROMUSCULAR REEDUCATION: CPT

## 2023-10-19 ENCOUNTER — HOSPITAL ENCOUNTER (OUTPATIENT)
Dept: PHYSICAL THERAPY | Age: 23
Setting detail: THERAPIES SERIES
Discharge: HOME | End: 2023-10-19
Payer: COMMERCIAL

## 2023-10-19 PROCEDURE — 97110 THERAPEUTIC EXERCISES: CPT

## 2023-10-19 PROCEDURE — 97530 THERAPEUTIC ACTIVITIES: CPT

## 2023-10-24 ENCOUNTER — HOSPITAL ENCOUNTER (OUTPATIENT)
Dept: PHYSICAL THERAPY | Age: 23
Setting detail: THERAPIES SERIES
Discharge: HOME | End: 2023-10-24
Payer: COMMERCIAL

## 2023-10-24 PROCEDURE — 97530 THERAPEUTIC ACTIVITIES: CPT

## 2023-10-24 PROCEDURE — 97110 THERAPEUTIC EXERCISES: CPT

## 2023-10-24 NOTE — FLOWSHEET NOTE
10/12  doing well - her body is feeling good. Looking for a new job - with better pay. Still having fatigue - intermittent and better. Has been house shopping with her good friend - she found a house and she will be in town until 10/16     10/10:  Pt reports 70% improvement since initiation of PT. Able to tolerate her work shifts without pain; may get slight discomfort at the end of her shift at times however overall is feeling good. States she has been wearing comfortable shoes to work and that has helped considerably      OBJECTIVE:   10/23 gait: overpronation B with R>>L LE ER  Gastroc flex: R lacking 2 from neutral, L 6 deg. Both with stiff endfeels    10/18 pt 15 min late due to issues with her dog this am  10/12 pt 9 min late  10/10:  Pt ambulating well this date without deviations and demonstrating good technique with all exercises. MMT BUE's 5/5 throughout  MMT BLE's 5/5 throughout      RESTRICTIONS/PRECAUTIONS: per pt report - autism, low energy, ADHD. Pt reports she is concerned re possible hypermobiltiy/EDS    Exercises/Interventions:     Therapeutic Exercises (89038) Resistance / level Sets/sec Reps Notes   MHP    FM cable cross   High row  LPD  Mid row    Multifidus walkouts  Seated SB wt shifts  Lateral walks with band    Cybex hip AB    FM leg press - s=2, footplate = 2          98#  25#  20#    NPV        35#    60#  Up/down 2 flights of stairs reciprocally no HR     20x  15x  20x            15x    15x  10/23 instructed in PT ex at [facility] in gym - in prep for transition to HEP /gym workouts on her own   Nu step 4    Workload 1   X 5 mins Sci fit s=14 arms = 7     Nu step s=7 arms = 11     8/22 decreased the workload and time due to elbow and knee pain.     IB/HR/TR   2x30\"/2x10/ 8/24:2nd set of HR in SLS   Standing hamstring stretch   2x30\" B    Standing hip flexor stretch   2x30\" B    Step gastroc sol stretches  Step HR   2x30\"  1x15 10/23 instructed to add these ex to address  irritation of

## 2023-10-26 ENCOUNTER — HOSPITAL ENCOUNTER (OUTPATIENT)
Dept: PHYSICAL THERAPY | Age: 23
Setting detail: THERAPIES SERIES
Discharge: HOME | End: 2023-10-26
Payer: COMMERCIAL

## 2023-10-26 NOTE — FLOWSHEET NOTE
Physical Therapy      [address] [place] [address]    Physical Therapy  Cancellation/No-show Note  Patient Name:  Jazmin Iverson  :  2000   Date:  10/26/2023    Cancelled visits to date: 1  No-shows to date: 1    For today's appointment patient:  []  Cancelled 23  []  Rescheduled appointment  [x]  No-show 10/26     Reason given by patient:  []  Patient ill  []  Conflicting appointment  []  No transportation    []  Conflict with work  []  No reason given  []  Other:     Comments:  : pt arrived 12 minutes late for appt due to oversleeping. Offered to re-schedule pt today in my Admin spot since there wasn't enough time to see the patient in remaining time since she has multi-body area issues. Pt rescheduled for another day due to having to go to work by 3 pm    Phone call information:     [x] 10/26 Phone call made today to patient at _424pm  time at number provided:   [phone] [facility])   []  Patient answered, conversation as follows:    [x]  10/26Patient did not answer, message left as follows: pt NS to visit today - PT calling to check on her. Please call clinic and let us know how you are doing and if you need to RS  []  Phone call not made today  [x]  Phone call not needed - pt contacted us to cancel and provided reason for cancellation.      Electronically signed by:  Mercedes Bean, PT, PT

## 2023-11-03 ENCOUNTER — OFFICE VISIT (OUTPATIENT)
Dept: INTERNAL MEDICINE CLINIC | Age: 23
End: 2023-11-03

## 2023-11-03 ENCOUNTER — TELEPHONE (OUTPATIENT)
Dept: INTERNAL MEDICINE CLINIC | Age: 23
End: 2023-11-03

## 2023-11-03 VITALS
SYSTOLIC BLOOD PRESSURE: 116 MMHG | DIASTOLIC BLOOD PRESSURE: 72 MMHG | WEIGHT: 155 LBS | HEART RATE: 98 BPM | OXYGEN SATURATION: 97 % | BODY MASS INDEX: 28.35 KG/M2

## 2023-11-03 DIAGNOSIS — R51.9 ACUTE NONINTRACTABLE HEADACHE, UNSPECIFIED HEADACHE TYPE: ICD-10-CM
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
## 2023-11-03 DIAGNOSIS — S13.4XXA WHIPLASH INJURY TO NECK, INITIAL ENCOUNTER: ICD-10-CM

## 2023-11-03 DIAGNOSIS — R42 DIZZINESS: ICD-10-CM

## 2023-11-03 NOTE — ASSESSMENT & PLAN NOTE
Acute, significant improvement. Discussed CT head - declined since having significant improvement. Reviewed strict criteria to seek emergency medical attention.
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
## 2024-04-13 ENCOUNTER — HOSPITAL ENCOUNTER (EMERGENCY)
Age: 24
Discharge: HOME | End: 2024-04-13
Attending: STUDENT IN AN ORGANIZED HEALTH CARE EDUCATION/TRAINING PROGRAM
Payer: COMMERCIAL

## 2024-04-13 VITALS
RESPIRATION RATE: 20 BRPM | TEMPERATURE: 98.1 F | HEIGHT: 66 IN | WEIGHT: 135.6 LBS | BODY MASS INDEX: 21.79 KG/M2 | OXYGEN SATURATION: 100 % | SYSTOLIC BLOOD PRESSURE: 129 MMHG | HEART RATE: 98 BPM | DIASTOLIC BLOOD PRESSURE: 88 MMHG

## 2024-04-13 DIAGNOSIS — R00.2 PALPITATIONS: Primary | ICD-10-CM

## 2024-04-13 LAB
ANION GAP SERPL CALCULATED.3IONS-SCNC: 13 MMOL/L (ref 3–16)
BASOPHILS # BLD: 0.1 K/UL (ref 0–0.2)
BASOPHILS NFR BLD: 0.7 %
BUN SERPL-MCNC: 10 MG/DL (ref 7–20)
CALCIUM SERPL-MCNC: 9.8 MG/DL (ref 8.3–10.6)
CHLORIDE SERPL-SCNC: 103 MMOL/L (ref 99–110)
CO2 SERPL-SCNC: 21 MMOL/L (ref 21–32)
CREAT SERPL-MCNC: 0.6 MG/DL (ref 0.6–1.1)
D DIMER: 0.32 UG/ML FEU (ref 0–0.6)
DEPRECATED RDW RBC AUTO: 14.6 % (ref 12.4–15.4)
EOSINOPHIL # BLD: 0.1 K/UL (ref 0–0.6)
EOSINOPHIL NFR BLD: 0.7 %
GFR SERPLBLD CREATININE-BSD FMLA CKD-EPI: >90 ML/MIN/{1.73_M2}
GLUCOSE SERPL-MCNC: 90 MG/DL (ref 70–99)
HCG SERPL QL: NEGATIVE
HCT VFR BLD AUTO: 40 % (ref 36–48)
HGB BLD-MCNC: 13 G/DL (ref 12–16)
LYMPHOCYTES # BLD: 2 K/UL (ref 1–5.1)
LYMPHOCYTES NFR BLD: 26.1 %
MAGNESIUM SERPL-MCNC: 2.4 MG/DL (ref 1.8–2.4)
MCH RBC QN AUTO: 27.6 PG (ref 26–34)
MCHC RBC AUTO-ENTMCNC: 32.4 G/DL (ref 31–36)
MCV RBC AUTO: 85 FL (ref 80–100)
MONOCYTES # BLD: 0.5 K/UL (ref 0–1.3)
MONOCYTES NFR BLD: 6.5 %
NEUTROPHILS # BLD: 5.1 K/UL (ref 1.7–7.7)
NEUTROPHILS NFR BLD: 66 %
PLATELET # BLD AUTO: 328 K/UL (ref 135–450)
PMV BLD AUTO: 8.8 FL (ref 5–10.5)
POTASSIUM SERPL-SCNC: 4 MMOL/L (ref 3.5–5.1)
RBC # BLD AUTO: 4.7 M/UL (ref 4–5.2)
SODIUM SERPL-SCNC: 137 MMOL/L (ref 136–145)
TROPONIN, HIGH SENSITIVITY: <6 NG/L (ref 0–14)
WBC # BLD AUTO: 7.7 K/UL (ref 4–11)

## 2024-04-13 PROCEDURE — 85025 COMPLETE CBC W/AUTO DIFF WBC: CPT

## 2024-04-13 PROCEDURE — 99284 EMERGENCY DEPT VISIT MOD MDM: CPT

## 2024-04-13 PROCEDURE — 84484 ASSAY OF TROPONIN QUANT: CPT

## 2024-04-13 PROCEDURE — 93005 ELECTROCARDIOGRAM TRACING: CPT | Performed by: STUDENT IN AN ORGANIZED HEALTH CARE EDUCATION/TRAINING PROGRAM

## 2024-04-13 PROCEDURE — 83735 ASSAY OF MAGNESIUM: CPT

## 2024-04-13 PROCEDURE — 85379 FIBRIN DEGRADATION QUANT: CPT

## 2024-04-13 PROCEDURE — 80048 BASIC METABOLIC PNL TOTAL CA: CPT

## 2024-04-13 PROCEDURE — 84703 CHORIONIC GONADOTROPIN ASSAY: CPT

## 2024-04-13 ASSESSMENT — PAIN - FUNCTIONAL ASSESSMENT: PAIN_FUNCTIONAL_ASSESSMENT: NONE - DENIES PAIN
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
## 2024-04-13 NOTE — DISCHARGE INSTRUCTIONS
Follow-up with your primary care doctor to discuss your recent visit here as well as potentially getting a Holter monitor.  Return if you develop any new or worsening symptoms.  Try to continue to avoid caffeinated products as you are already doing

## 2024-04-14 LAB
EKG ATRIAL RATE: 100 BPM
EKG DIAGNOSIS: NORMAL
EKG P AXIS: 39 DEGREES
EKG P-R INTERVAL: 148 MS
EKG Q-T INTERVAL: 312 MS
EKG QRS DURATION: 80 MS
EKG QTC CALCULATION (BAZETT): 402 MS
EKG R AXIS: 50 DEGREES
EKG T AXIS: 30 DEGREES
EKG VENTRICULAR RATE: 100 BPM

## 2024-04-14 PROCEDURE — 93010 ELECTROCARDIOGRAM REPORT: CPT | Performed by: INTERNAL MEDICINE

## 2024-04-17 ENCOUNTER — OFFICE VISIT (OUTPATIENT)
Dept: INTERNAL MEDICINE CLINIC | Age: 24
End: 2024-04-17
Payer: COMMERCIAL

## 2024-04-17 VITALS
WEIGHT: 138.4 LBS | SYSTOLIC BLOOD PRESSURE: 120 MMHG | HEIGHT: 62 IN | OXYGEN SATURATION: 98 % | BODY MASS INDEX: 25.47 KG/M2 | HEART RATE: 120 BPM | DIASTOLIC BLOOD PRESSURE: 78 MMHG

## 2024-04-17 DIAGNOSIS — R00.2 PALPITATIONS: Primary | ICD-10-CM

## 2024-04-17 DIAGNOSIS — F90.0 ATTENTION DEFICIT HYPERACTIVITY DISORDER (ADHD), PREDOMINANTLY INATTENTIVE TYPE: ICD-10-CM

## 2024-04-17 DIAGNOSIS — R00.2 PALPITATIONS: ICD-10-CM

## 2024-04-17 DIAGNOSIS — F43.23 ADJUSTMENT DISORDER WITH MIXED ANXIETY AND DEPRESSED MOOD: ICD-10-CM

## 2024-04-17 DIAGNOSIS — F41.1 GENERALIZED ANXIETY DISORDER: ICD-10-CM

## 2024-04-17 DIAGNOSIS — F43.10 PTSD (POST-TRAUMATIC STRESS DISORDER): ICD-10-CM

## 2024-04-17 DIAGNOSIS — F33.42 RECURRENT MAJOR DEPRESSIVE DISORDER, IN FULL REMISSION (HCC): ICD-10-CM

## 2024-04-17 DIAGNOSIS — J45.20 MILD INTERMITTENT ASTHMA WITHOUT COMPLICATION: ICD-10-CM

## 2024-04-17 PROBLEM — S13.4XXA NECK PAIN WITH NECK STIFFNESS AFTER WHIPLASH INJURY TO NECK: Status: RESOLVED | Noted: 2023-11-03 | Resolved: 2024-04-17
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
## 2024-04-17 LAB
T4 FREE SERPL-MCNC: 1.2 NG/DL (ref 0.9–1.8)
TSH SERPL DL<=0.005 MIU/L-ACNC: 0.68 UIU/ML (ref 0.27–4.2)

## 2024-04-17 PROCEDURE — 1036F TOBACCO NON-USER: CPT | Performed by: NURSE PRACTITIONER

## 2024-04-17 PROCEDURE — G8419 CALC BMI OUT NRM PARAM NOF/U: HCPCS | Performed by: NURSE PRACTITIONER

## 2024-04-17 PROCEDURE — 99214 OFFICE O/P EST MOD 30 MIN: CPT | Performed by: NURSE PRACTITIONER

## 2024-04-17 PROCEDURE — G8427 DOCREV CUR MEDS BY ELIG CLIN: HCPCS | Performed by: NURSE PRACTITIONER

## 2024-04-17 RX ORDER — BUDESONIDE AND FORMOTEROL FUMARATE DIHYDRATE 160; 4.5 UG/1; UG/1
2 AEROSOL RESPIRATORY (INHALATION) 2 TIMES DAILY
Qty: 30.6 G | Refills: 3 | Status: SHIPPED | OUTPATIENT
Start: 2024-04-17

## 2024-04-17 SDOH — ECONOMIC STABILITY: FOOD INSECURITY: WITHIN THE PAST 12 MONTHS, YOU WORRIED THAT YOUR FOOD WOULD RUN OUT BEFORE YOU GOT MONEY TO BUY MORE.: NEVER TRUE

## 2024-04-17 SDOH — ECONOMIC STABILITY: FOOD INSECURITY: WITHIN THE PAST 12 MONTHS, THE FOOD YOU BOUGHT JUST DIDN'T LAST AND YOU DIDN'T HAVE MONEY TO GET MORE.: NEVER TRUE

## 2024-04-17 SDOH — ECONOMIC STABILITY: INCOME INSECURITY: HOW HARD IS IT FOR YOU TO PAY FOR THE VERY BASICS LIKE FOOD, HOUSING, MEDICAL CARE, AND HEATING?: NOT HARD AT ALL

## 2024-04-17 ASSESSMENT — PATIENT HEALTH QUESTIONNAIRE - PHQ9
SUM OF ALL RESPONSES TO PHQ QUESTIONS 1-9: 11
7. TROUBLE CONCENTRATING ON THINGS, SUCH AS READING THE NEWSPAPER OR WATCHING TELEVISION: NEARLY EVERY DAY
9. THOUGHTS THAT YOU WOULD BE BETTER OFF DEAD, OR OF HURTING YOURSELF: SEVERAL DAYS
SUM OF ALL RESPONSES TO PHQ QUESTIONS 1-9: 12
1. LITTLE INTEREST OR PLEASURE IN DOING THINGS: SEVERAL DAYS
4. FEELING TIRED OR HAVING LITTLE ENERGY: NEARLY EVERY DAY
SUM OF ALL RESPONSES TO PHQ9 QUESTIONS 1 & 2: 2
6. FEELING BAD ABOUT YOURSELF - OR THAT YOU ARE A FAILURE OR HAVE LET YOURSELF OR YOUR FAMILY DOWN: SEVERAL DAYS
10. IF YOU CHECKED OFF ANY PROBLEMS, HOW DIFFICULT HAVE THESE PROBLEMS MADE IT FOR YOU TO DO YOUR WORK, TAKE CARE OF THINGS AT HOME, OR GET ALONG WITH OTHER PEOPLE: SOMEWHAT DIFFICULT
SUM OF ALL RESPONSES TO PHQ QUESTIONS 1-9: 12
8. MOVING OR SPEAKING SO SLOWLY THAT OTHER PEOPLE COULD HAVE NOTICED. OR THE OPPOSITE, BEING SO FIGETY OR RESTLESS THAT YOU HAVE BEEN MOVING AROUND A LOT MORE THAN USUAL: NOT AT ALL
SUM OF ALL RESPONSES TO PHQ QUESTIONS 1-9: 12
5. POOR APPETITE OR OVEREATING: SEVERAL DAYS
2. FEELING DOWN, DEPRESSED OR HOPELESS: SEVERAL DAYS
3. TROUBLE FALLING OR STAYING ASLEEP: SEVERAL DAYS

## 2024-04-17 ASSESSMENT — ENCOUNTER SYMPTOMS
WHEEZING: 0
CHEST TIGHTNESS: 0
COUGH: 0
SHORTNESS OF BREATH: 0

## 2024-04-17 ASSESSMENT — COLUMBIA-SUICIDE SEVERITY RATING SCALE - C-SSRS
1. WITHIN THE PAST MONTH, HAVE YOU WISHED YOU WERE DEAD OR WISHED YOU COULD GO TO SLEEP AND NOT WAKE UP?: YES
6. HAVE YOU EVER DONE ANYTHING, STARTED TO DO ANYTHING, OR PREPARED TO DO ANYTHING TO END YOUR LIFE?: NO
2. HAVE YOU ACTUALLY HAD ANY THOUGHTS OF KILLING YOURSELF?: NO

## 2024-04-17 NOTE — ASSESSMENT & PLAN NOTE
Chronic, controlled. Continue symbicort BID and albuterol prn .   Alert and oriented, no focal deficits, no motor or sensory deficits.

## 2024-04-17 NOTE — ASSESSMENT & PLAN NOTE
Chronic. Improving. Continue seeing psychiatry and psychology as recommended. Continue medications and plan per specialist. No red flags today.

## 2024-04-17 NOTE — PROGRESS NOTES
Mild intermittent asthma without complication  Assessment & Plan:  Chronic, controlled. Continue symbicort BID and albuterol prn .  Orders:  -     budesonide-formoterol (SYMBICORT) 160-4.5 MCG/ACT AERO; Inhale 2 puffs into the lungs 2 times daily, Disp-30.6 g, R-3Normal  3. Generalized anxiety disorder  Assessment & Plan:  Chronic. Improving. Continue seeing psychiatry and psychology as recommended. Continue medications and plan per specialist. No red flags today.   4. Adjustment disorder with mixed anxiety and depressed mood  Assessment & Plan:   Chronic. Improving. Continue seeing psychiatry and psychology as recommended. Continue medications and plan per specialist. No red flags today.   5. Attention deficit hyperactivity disorder (ADHD), predominantly inattentive type  Assessment & Plan:   Chronic. Improving. Continue seeing psychiatry and psychology as recommended. Continue medications and plan per specialist. No red flags today.   6. Recurrent major depressive disorder, in full remission (HCC)  Assessment & Plan:   Chronic. Improving. Continue seeing psychiatry and psychology as recommended. Continue medications and plan per specialist. No red flags today.   7. PTSD (post-traumatic stress disorder)  Assessment & Plan:   Chronic. Improving. Continue seeing psychiatry and psychology as recommended. Continue medications and plan per specialist. No red flags today.      Discussed medications with patient, who voiced understanding of their use and indications. All questions answered.    Return in about 6 weeks (around 5/29/2024), or if symptoms worsen or fail to improve, for follow up of holter results .      Electronically signed by DELIO Henao CNP on 4/17/2024 at 10:27 AM

## 2024-04-17 NOTE — ASSESSMENT & PLAN NOTE
Acute. Recurrent.   Improving since ED visit.   ED notes and workup reviewed in detail with patient.  Checking thyroid function  Heart rate elevations with orthostatic BP/HR check   Checking holter monitor  Discussed starting BB pending holter results   Work on hydration   Wear compression   Limit caffeine and sodium intake

## 2024-04-18 ENCOUNTER — PATIENT MESSAGE (OUTPATIENT)
Dept: INTERNAL MEDICINE CLINIC | Age: 24
End: 2024-04-18

## 2024-04-18 LAB
EST. AVERAGE GLUCOSE BLD GHB EST-MCNC: 114 MG/DL
HBA1C MFR BLD: 5.6 %

## 2024-04-18 NOTE — TELEPHONE ENCOUNTER
From: Melisa Lopez  To: Bonnie Arciniega  Sent: 4/18/2024 11:09 AM EDT  Subject: Holter monitor     Babatunde Nicole!   I was in yesterday and you had mentioned giving me a phone number to call to get scheduled for the holter monitor. Today I was finally going to call, but I can't find any numbers other than just the [facility] scheduling which was stamped onto my papers. Is that the number I'm supposed to call, or is there a number specifically for the place with the monitor?  Thanks!  Melisa

## 2024-04-19 ENCOUNTER — PATIENT MESSAGE (OUTPATIENT)
Dept: INTERNAL MEDICINE CLINIC | Age: 24
End: 2024-04-19

## 2024-06-06 ENCOUNTER — HOSPITAL ENCOUNTER (OUTPATIENT)
Age: 24
Discharge: HOME | End: 2024-06-08
Payer: COMMERCIAL

## 2024-06-06 DIAGNOSIS — R00.2 PALPITATIONS: ICD-10-CM

## 2024-06-06 PROCEDURE — 93225 XTRNL ECG REC<48 HRS REC: CPT

## 2024-06-24 ENCOUNTER — OFFICE VISIT (OUTPATIENT)
Dept: INTERNAL MEDICINE CLINIC | Age: 24
End: 2024-06-24
Payer: COMMERCIAL

## 2024-06-24 VITALS
DIASTOLIC BLOOD PRESSURE: 74 MMHG | OXYGEN SATURATION: 98 % | WEIGHT: 137.6 LBS | BODY MASS INDEX: 25.32 KG/M2 | SYSTOLIC BLOOD PRESSURE: 120 MMHG | HEART RATE: 95 BPM | HEIGHT: 62 IN

## 2024-06-24 DIAGNOSIS — R00.0 TACHYCARDIA: ICD-10-CM

## 2024-06-24 DIAGNOSIS — R00.2 PALPITATIONS: Primary | ICD-10-CM

## 2024-06-24 PROCEDURE — 99213 OFFICE O/P EST LOW 20 MIN: CPT | Performed by: NURSE PRACTITIONER

## 2024-06-24 PROCEDURE — 1036F TOBACCO NON-USER: CPT | Performed by: NURSE PRACTITIONER

## 2024-06-24 PROCEDURE — G8419 CALC BMI OUT NRM PARAM NOF/U: HCPCS | Performed by: NURSE PRACTITIONER

## 2024-06-24 PROCEDURE — G8427 DOCREV CUR MEDS BY ELIG CLIN: HCPCS | Performed by: NURSE PRACTITIONER

## 2024-06-24 RX ORDER — PROPRANOLOL HCL 60 MG
60 CAPSULE, EXTENDED RELEASE 24HR ORAL DAILY
Qty: 30 CAPSULE | Refills: 1 | Status: SHIPPED | OUTPATIENT
Start: 2024-06-24

## 2024-06-24 NOTE — ASSESSMENT & PLAN NOTE
Chronic, intermittent.    Reviewed blood work and holter monitor in detail with pt.   Work on hydration   Wear compression   Limit caffeine intake   Trial of propranolol

## 2024-06-24 NOTE — PROGRESS NOTES
6/24/24     Chief Complaint   Patient presents with    Follow-up     6 weeks      HPI    Here for 6 week follow up of palpitations and tachycardia. She had a holter monitor - SR and sinus tachycardia, tachy > 50%.    Reports symptoms are triggered more with heat. She can tell when HR is elevated, feels shaky and sometimes dizzy.   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   Drinks some caffeine, limits it due to being a trigger. She is drinking plenty of water   She tried to increase her sodium intake and this has helped a little with her symptoms.     Allergies   Allergen Reactions    Pecan Pollen Shortness Of Breath    Citalopram Other (See Comments)    Other      Other reaction(s): Tested positive  Pecan tree and hickory tree pollen    Amoxicillin Hives and Rash       Current Outpatient Medications   Medication Sig Dispense Refill    propranolol (INDERAL LA) 60 MG extended release capsule Take 1 capsule by mouth daily 30 capsule 1    budesonide-formoterol (SYMBICORT) 160-4.5 MCG/ACT AERO Inhale 2 puffs into the lungs 2 times daily 30.6 g 3    CLONAZEPAM PO Take by mouth Unknown dose - from [facility]      albuterol sulfate HFA (PROVENTIL;VENTOLIN;PROAIR) 108 (90 Base) MCG/ACT inhaler Inhale 2 puffs into the lungs every 6 hours as needed for Wheezing or Shortness of Breath 18 g 3    ibuprofen (ADVIL;MOTRIN) 400 MG tablet Take 1 tablet by mouth every 6 hours as needed for Pain 30 tablet 0    methylphenidate (RITALIN) 10 MG tablet       methylphenidate (CONCERTA) 18 MG extended release tablet       venlafaxine (EFFEXOR XR) 150 MG extended release capsule Take 75 mg by mouth daily      diphenhydrAMINE HCl (ALLERGY MED PO) Take by mouth      Multiple Vitamins-Minerals (WOMENS MULTIVITAMIN PO) Take by mouth Shakee womens with iron      ELDERBERRY PO Take by mouth       No current facility-administered medications for this visit.     Review of Systems  Negative other than HPI     Vitals:    06/24/24 0748 06/24/24

## 2024-07-25 ENCOUNTER — TELEMEDICINE (OUTPATIENT)
Age: 24
End: 2024-07-25
Payer: COMMERCIAL

## 2024-07-25 DIAGNOSIS — J02.9 SORE THROAT: ICD-10-CM

## 2024-07-25 DIAGNOSIS — U07.1 COVID-19: ICD-10-CM

## 2024-07-25 DIAGNOSIS — R05.1 ACUTE COUGH: Primary | ICD-10-CM

## 2024-07-25 DIAGNOSIS — R51.9 HEADACHE DUE TO VIRAL INFECTION: ICD-10-CM

## 2024-07-25 DIAGNOSIS — R09.81 NASAL CONGESTION: ICD-10-CM

## 2024-07-25 DIAGNOSIS — B34.9 HEADACHE DUE TO VIRAL INFECTION: ICD-10-CM

## 2024-07-25 DIAGNOSIS — R53.83 FATIGUE, UNSPECIFIED TYPE: ICD-10-CM

## 2024-07-25 PROCEDURE — 1036F TOBACCO NON-USER: CPT | Performed by: NURSE PRACTITIONER

## 2024-07-25 PROCEDURE — G8419 CALC BMI OUT NRM PARAM NOF/U: HCPCS | Performed by: NURSE PRACTITIONER

## 2024-07-25 PROCEDURE — G8427 DOCREV CUR MEDS BY ELIG CLIN: HCPCS | Performed by: NURSE PRACTITIONER

## 2024-07-25 PROCEDURE — 99213 OFFICE O/P EST LOW 20 MIN: CPT | Performed by: NURSE PRACTITIONER

## 2024-07-25 RX ORDER — FLUTICASONE PROPIONATE 50 MCG
2 SPRAY, SUSPENSION (ML) NASAL DAILY
Qty: 16 G | Refills: 0 | Status: SHIPPED | OUTPATIENT
Start: 2024-07-25

## 2024-07-25 RX ORDER — DEXTROMETHORPHAN HYDROBROMIDE AND PROMETHAZINE HYDROCHLORIDE 15; 6.25 MG/5ML; MG/5ML
5 SYRUP ORAL 4 TIMES DAILY PRN
Qty: 118 ML | Refills: 0 | Status: SHIPPED | OUTPATIENT
Start: 2024-07-25 | End: 2024-08-01

## 2024-07-25 RX ORDER — BENZONATATE 100 MG/1
100 CAPSULE ORAL 3 TIMES DAILY PRN
Qty: 30 CAPSULE | Refills: 0 | Status: SHIPPED | OUTPATIENT
Start: 2024-07-25 | End: 2024-08-04

## 2024-07-25 ASSESSMENT — ENCOUNTER SYMPTOMS
WHEEZING: 0
SHORTNESS OF BREATH: 0
SORE THROAT: 1
COUGH: 1

## 2024-07-25 NOTE — PROGRESS NOTES
Melisa Lopez, was evaluated through a synchronous (real-time) audio-video encounter. The patient (or guardian if applicable) is aware that this is a billable service, which includes applicable co-pays. This Virtual Visit was conducted with patient's (and/or legal guardian's) consent. Patient identification was verified, and a caregiver was present when appropriate.   The patient was located at Home: [address] [place] [address]  Provider was located at Home (Appt Dept State): [place]  Confirm you are appropriately licensed, registered, or certified to deliver care in the state where the patient is located as indicated above. If you are not or unsure, please re-schedule the visit: Yes, I confirm.     Melisa Lopez (:  2000) is a Established patient, presenting virtually for evaluation of the following:  Symptoms started Monday, tested positive Tuesday evening. Pt c/o fatigue, sore throat, cough, nasal congestion, drainage, body aches, headache, sneezing.     Assessment & Plan   Below is the assessment and plan developed based on review of pertinent history, physical exam, labs, studies, and medications.  1. Acute cough  Problem-she was instructed to alternate the cough syrup and the Tessalon Perles approximately 3 to 4 hours apart from taking each other.  She was instructed to not take these together she verbalized understanding of this information  -     promethazine-dextromethorphan (PROMETHAZINE-DM) 6.25-15 MG/5ML syrup; Take 5 mLs by mouth 4 times daily as needed for Cough, Disp-118 mL, R-0Normal  -     benzonatate (TESSALON) 100 MG capsule; Take 1 capsule by mouth 3 times daily as needed for Cough, Disp-30 capsule, R-0Normal  See patient instructions    A cough is your body's response to something that bothers your throat or airways. Many things can cause a cough. You might cough because of a cold or the flu, bronchitis, or asthma. Smoking, postnasal drip, allergies, and stomach acid that backs

## 2024-07-29 ENCOUNTER — TELEMEDICINE (OUTPATIENT)
Age: 24
End: 2024-07-29
Payer: COMMERCIAL

## 2024-07-29 DIAGNOSIS — R50.9 LOW GRADE FEVER: ICD-10-CM

## 2024-07-29 DIAGNOSIS — U07.1 COVID-19: ICD-10-CM

## 2024-07-29 DIAGNOSIS — R42 DIZZINESS: Primary | ICD-10-CM

## 2024-07-29 PROCEDURE — G8419 CALC BMI OUT NRM PARAM NOF/U: HCPCS | Performed by: NURSE PRACTITIONER

## 2024-07-29 PROCEDURE — G8427 DOCREV CUR MEDS BY ELIG CLIN: HCPCS | Performed by: NURSE PRACTITIONER

## 2024-07-29 PROCEDURE — 99213 OFFICE O/P EST LOW 20 MIN: CPT | Performed by: NURSE PRACTITIONER

## 2024-07-29 PROCEDURE — 1036F TOBACCO NON-USER: CPT | Performed by: NURSE PRACTITIONER

## 2024-07-29 RX ORDER — MECLIZINE HYDROCHLORIDE 25 MG/1
25 TABLET ORAL 3 TIMES DAILY PRN
Qty: 15 TABLET | Refills: 0 | Status: SHIPPED | OUTPATIENT
Start: 2024-07-29 | End: 2024-08-03

## 2024-07-29 ASSESSMENT — ENCOUNTER SYMPTOMS: COUGH: 1

## 2024-07-29 NOTE — PROGRESS NOTES
Melisa Lopez, was evaluated through a synchronous (real-time) audio-video encounter. The patient (or guardian if applicable) is aware that this is a billable service, which includes applicable co-pays. This Virtual Visit was conducted with patient's (and/or legal guardian's) consent. Patient identification was verified, and a caregiver was present when appropriate.   The patient was located at Home: [address] [place] [address]  Provider was located at Home (Appt Dept State): [place]  Confirm you are appropriately licensed, registered, or certified to deliver care in the state where the patient is located as indicated above. If you are not or unsure, please re-schedule the visit: Yes, I confirm.     Melisa Lopez (:  2000) is a Established patient, presenting virtually for evaluation of the following:  Still testing positive as of Saturday, now has low grade fever, and dizziness when laying down   Assessment & Plan   Below is the assessment and plan developed based on review of pertinent history, physical exam, labs, studies, and medications.  1. Dizziness  Problem  -     meclizine (ANTIVERT) 25 MG tablet; Take 1 tablet by mouth 3 times daily as needed for Dizziness, Disp-15 tablet, R-0Normal  See patient instructions    Dizziness is the feeling of unsteadiness or fuzziness in your head. It is different than having vertigo, which is a feeling that the room is spinning or that you are moving or falling. It is also different from lightheadedness, which is the feeling that you are about to faint.  It can be hard to know what causes dizziness. Some people feel dizzy when they have migraine headaches. Sometimes bouts of flu can make you feel dizzy. Some medical conditions, such as heart problems or high blood pressure, can make you feel dizzy. Many medicines can cause dizziness, including medicines for high blood pressure, pain, or anxiety.  If a medicine causes your symptoms, your doctor may recommend

## 2024-08-05 ENCOUNTER — OFFICE VISIT (OUTPATIENT)
Dept: INTERNAL MEDICINE CLINIC | Age: 24
End: 2024-08-05
Payer: COMMERCIAL

## 2024-08-05 VITALS
HEIGHT: 62 IN | SYSTOLIC BLOOD PRESSURE: 102 MMHG | DIASTOLIC BLOOD PRESSURE: 80 MMHG | HEART RATE: 85 BPM | BODY MASS INDEX: 24.25 KG/M2 | WEIGHT: 131.8 LBS | OXYGEN SATURATION: 98 %

## 2024-08-05 DIAGNOSIS — U07.1 COVID-19: ICD-10-CM

## 2024-08-05 DIAGNOSIS — R00.2 PALPITATIONS: Primary | ICD-10-CM

## 2024-08-05 DIAGNOSIS — R00.0 TACHYCARDIA: ICD-10-CM

## 2024-08-05 DIAGNOSIS — R43.8 DECREASED SENSE OF SMELL: ICD-10-CM

## 2024-08-05 PROBLEM — R51.9 HEADACHE: Status: RESOLVED | Noted: 2023-11-03 | Resolved: 2024-08-05

## 2024-08-05 PROBLEM — N63.0 BREAST LUMP IN UPPER INNER QUADRANT: Status: RESOLVED | Noted: 2023-01-31 | Resolved: 2024-08-05

## 2024-08-05 PROCEDURE — 99214 OFFICE O/P EST MOD 30 MIN: CPT | Performed by: NURSE PRACTITIONER

## 2024-08-05 PROCEDURE — 1036F TOBACCO NON-USER: CPT | Performed by: NURSE PRACTITIONER

## 2024-08-05 PROCEDURE — G8427 DOCREV CUR MEDS BY ELIG CLIN: HCPCS | Performed by: NURSE PRACTITIONER

## 2024-08-05 PROCEDURE — G8420 CALC BMI NORM PARAMETERS: HCPCS | Performed by: NURSE PRACTITIONER

## 2024-08-05 PROCEDURE — G2211 COMPLEX E/M VISIT ADD ON: HCPCS | Performed by: NURSE PRACTITIONER

## 2024-08-05 RX ORDER — PROPRANOLOL HCL 60 MG
60 CAPSULE, EXTENDED RELEASE 24HR ORAL DAILY
Qty: 90 CAPSULE | Refills: 1 | Status: SHIPPED | OUTPATIENT
Start: 2024-08-05

## 2024-08-05 NOTE — ASSESSMENT & PLAN NOTE
Chronic. Controlled. Continue to work on hydration, wear compression, limit caffeine. Continue propranolol 60 mg daily.

## 2024-08-05 NOTE — PATIENT INSTRUCTIONS
It is best to start with at least four different scents, especially smells you remember. The most recommended fragrances are melisa (floral), lemon (fruity), cloves (spicy), and eucalyptus (resinous). Take sniffs of each scent for 10 to 20 seconds at least once or twice a day.

## 2024-08-05 NOTE — PROGRESS NOTES
smell retraining techniques.     Discussed medications with patient, who voiced understanding of their use and indications. All questions answered.    Return in about 6 months (around 2/5/2025), or if symptoms worsen or fail to improve, for annual exam .      Electronically signed by DELIO Henao CNP on 8/5/2024 at 9:24 AM

## 2024-08-05 NOTE — ASSESSMENT & PLAN NOTE
Acute. Overall improved.  Some mild residual symptoms. Reviewed supportive care with rest, hydration, small frequent meals. Okay to use OTC medication if needed for nausea. Reviewed criteria for follow up.

## 2024-08-21 DIAGNOSIS — R09.81 NASAL CONGESTION: ICD-10-CM

## 2024-08-21 RX ORDER — FLUTICASONE PROPIONATE 50 MCG
2 SPRAY, SUSPENSION (ML) NASAL DAILY
OUTPATIENT
Start: 2024-08-21

## 2024-08-26 ENCOUNTER — OFFICE VISIT (OUTPATIENT)
Age: 24
End: 2024-08-26

## 2024-08-26 VITALS
TEMPERATURE: 98.1 F | OXYGEN SATURATION: 98 % | HEIGHT: 62 IN | WEIGHT: 131 LBS | HEART RATE: 78 BPM | BODY MASS INDEX: 24.11 KG/M2 | SYSTOLIC BLOOD PRESSURE: 113 MMHG | DIASTOLIC BLOOD PRESSURE: 77 MMHG

## 2024-08-26 DIAGNOSIS — R42 DIZZINESS: Primary | ICD-10-CM

## 2024-08-26 ASSESSMENT — ENCOUNTER SYMPTOMS
RHINORRHEA: 0
SORE THROAT: 0
NAUSEA: 0
COUGH: 0
ABDOMINAL PAIN: 0
BACK PAIN: 0
SHORTNESS OF BREATH: 0
DIARRHEA: 0
VOMITING: 0

## 2024-08-26 NOTE — PROGRESS NOTES
Melisa Lopez (:  2000) is a 24 y.o. female,New patient, here for evaluation of the following chief complaint(s):  Dizziness (Pt has been dizzy, felt like she was going to pass out, fatigued, numb arms and leg, all symptoms started today./PT was recently diagnosed with FRAIRE)      Assessment & Plan :  Visit Diagnoses and Associated Orders       Dizziness    -  Primary               Differential diagnoses: dehydration, POTS, vertigo, dizziness, headache, ear effusions    Plan: Appears to have an exacerbation of POTS flare-up. There is low concern for significant dehydration as her heart rate is within normal limits and she is not hypertensive or hypotensive. I advised her to continue to push fluid intake and encouraged her to rest and increase fluid intake. She states that her dizziness has improved since she returned home from work. Advised her to follow-up with her PCP tomorrow. Return/ED precautions discussed. Follow up in 2 days if symptoms persist or if symptoms worsen.       Subjective :  HPI  Melisa Lopez is a 24 y.o. female who presents with complaints of dizziness. She reports that she has had problems with dizziness throughout her life. She reports that she was recently diagnosed with POTS. She states that she went to work today and throughout the day noted worsening dizziness. She states that she was getting to the point of feeling like she was going to pass out so she called her mom and she drove her home. 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 She does feel mildly short of breath but denies chest pain. She has been taking propranolol since she was diagnosed with POTS which has helped with exacerbations. She believes she has stayed well hydrated today and has ate salt packets and salty nuts. She denies any ear pain or noting any

## 2024-08-26 NOTE — PATIENT INSTRUCTIONS
Continue to take your propanolol as prescribed.  Encourage rest and increase fluid intake.  Follow-up with your PCP as needed.  Return for severe/worsening symptoms.

## 2024-08-27 ENCOUNTER — OFFICE VISIT (OUTPATIENT)
Dept: INTERNAL MEDICINE CLINIC | Age: 24
End: 2024-08-27
Payer: COMMERCIAL

## 2024-08-27 VITALS
WEIGHT: 131.8 LBS | BODY MASS INDEX: 24.25 KG/M2 | HEIGHT: 62 IN | HEART RATE: 81 BPM | SYSTOLIC BLOOD PRESSURE: 112 MMHG | DIASTOLIC BLOOD PRESSURE: 70 MMHG | OXYGEN SATURATION: 98 %

## 2024-08-27 DIAGNOSIS — R00.2 PALPITATIONS: ICD-10-CM

## 2024-08-27 DIAGNOSIS — R42 DIZZINESS: ICD-10-CM

## 2024-08-27 DIAGNOSIS — T78.40XD ALLERGY, SUBSEQUENT ENCOUNTER: ICD-10-CM

## 2024-08-27 DIAGNOSIS — R42 DIZZINESS: Primary | ICD-10-CM

## 2024-08-27 LAB
ALBUMIN SERPL-MCNC: 4.4 G/DL (ref 3.4–5)
ALBUMIN/GLOB SERPL: 1.7 {RATIO} (ref 1.1–2.2)
ALP SERPL-CCNC: 69 U/L (ref 40–129)
ALT SERPL-CCNC: 9 U/L (ref 10–40)
ANION GAP SERPL CALCULATED.3IONS-SCNC: 8 MMOL/L (ref 3–16)
AST SERPL-CCNC: 17 U/L (ref 15–37)
BILIRUB SERPL-MCNC: 0.3 MG/DL (ref 0–1)
BUN SERPL-MCNC: 8 MG/DL (ref 7–20)
CALCIUM SERPL-MCNC: 9.7 MG/DL (ref 8.3–10.6)
CHLORIDE SERPL-SCNC: 105 MMOL/L (ref 99–110)
CO2 SERPL-SCNC: 26 MMOL/L (ref 21–32)
CREAT SERPL-MCNC: 0.7 MG/DL (ref 0.6–1.1)
DEPRECATED RDW RBC AUTO: 16.4 % (ref 12.4–15.4)
FOLATE SERPL-MCNC: 29.4 NG/ML (ref 4.78–24.2)
GFR SERPLBLD CREATININE-BSD FMLA CKD-EPI: >90 ML/MIN/{1.73_M2}
GLUCOSE SERPL-MCNC: 89 MG/DL (ref 70–99)
HCT VFR BLD AUTO: 38.1 % (ref 36–48)
HGB BLD-MCNC: 12.5 G/DL (ref 12–16)
MCH RBC QN AUTO: 27.8 PG (ref 26–34)
MCHC RBC AUTO-ENTMCNC: 32.8 G/DL (ref 31–36)
MCV RBC AUTO: 84.9 FL (ref 80–100)
PLATELET # BLD AUTO: 333 K/UL (ref 135–450)
PMV BLD AUTO: 9.1 FL (ref 5–10.5)
POTASSIUM SERPL-SCNC: 4.5 MMOL/L (ref 3.5–5.1)
PROT SERPL-MCNC: 7 G/DL (ref 6.4–8.2)
RBC # BLD AUTO: 4.49 M/UL (ref 4–5.2)
SODIUM SERPL-SCNC: 139 MMOL/L (ref 136–145)
TSH SERPL DL<=0.005 MIU/L-ACNC: 1.18 UIU/ML (ref 0.27–4.2)
VIT B12 SERPL-MCNC: 436 PG/ML (ref 211–911)
WBC # BLD AUTO: 5.2 K/UL (ref 4–11)

## 2024-08-27 PROCEDURE — G8420 CALC BMI NORM PARAMETERS: HCPCS | Performed by: NURSE PRACTITIONER

## 2024-08-27 PROCEDURE — 99214 OFFICE O/P EST MOD 30 MIN: CPT | Performed by: NURSE PRACTITIONER

## 2024-08-27 PROCEDURE — 1036F TOBACCO NON-USER: CPT | Performed by: NURSE PRACTITIONER

## 2024-08-27 PROCEDURE — G8427 DOCREV CUR MEDS BY ELIG CLIN: HCPCS | Performed by: NURSE PRACTITIONER

## 2024-08-27 RX ORDER — MECLIZINE HYDROCHLORIDE 25 MG/1
25 TABLET ORAL 3 TIMES DAILY PRN
Qty: 30 TABLET | Refills: 0 | Status: SHIPPED | OUTPATIENT
Start: 2024-08-27 | End: 2024-09-06

## 2024-08-27 NOTE — ASSESSMENT & PLAN NOTE
Chronic. Controlled.  Continue propranolol 60 mg daily. Continue to work on hydration, wear compression, limit caffeine.

## 2024-08-27 NOTE — ASSESSMENT & PLAN NOTE
acute, improving. Other symptoms resolved.  Continue compression and hydration. Repeating blood work. Restart flonase chronic otitis media. Trial of meclizine prn if needed for dizziness.  Reviewed strict criteria for follow up. Urgent care note reviewed with pt.     Orders:    meclizine (ANTIVERT) 25 MG tablet; Take 1 tablet by mouth 3 times daily as needed for Dizziness    Comprehensive Metabolic Panel; Future    CBC; Future    TSH with Reflex to FT4 ([place] Only); Future    Vitamin B12 & Folate; Future

## 2024-08-27 NOTE — PROGRESS NOTES
8/27/24     Chief Complaint   Patient presents with    Dizziness     Numbness of both arms and legs, nausea, blurred vision, body \"weakness\" and SOB. Episode yesterday while at work. Patient's mom had to  patient from work and take her to urgent care.      HPI    Here for dizziness   Started yesterday   HR was up a little yesterday morning, 100, could tell she felt a little off.  She started to get a little dizzy at work yesterday - she put on compression, started hydrating, tried a salty snack.  Symptoms got worse as the day went on. Had a period of feeling weak, generalized numbness/ tingling, nausea, SOB.  Sat on the floor with head between her knees - mom came to pick her up from work and went to urgent care.     Blurred vision for a minute last night when lights were off- resolved when light was on.     Overall feeling better than yesterday. Still having some intermittent dizziness. Dizziness is worse with certain / fast movements. Reports it is not a spinning sensation but then reports a mild spinning sensation during office visit.    Taking an OTC claritin once daily for allergies. Has not been using Flonase.     Allergies   Allergen Reactions    Pecan Pollen Shortness Of Breath    Citalopram Other (See Comments)    Other      Other reaction(s): Tested positive  Pecan tree and hickory tree pollen    Amoxicillin Hives and Rash       Current Outpatient Medications   Medication Sig Dispense Refill    meclizine (ANTIVERT) 25 MG tablet Take 1 tablet by mouth 3 times daily as needed for Dizziness 30 tablet 0    propranolol (INDERAL LA) 60 MG extended release capsule Take 1 capsule by mouth daily 90 capsule 1    fluticasone (FLONASE) 50 MCG/ACT nasal spray 2 sprays by Each Nostril route daily 16 g 0    albuterol sulfate HFA (PROVENTIL;VENTOLIN;PROAIR) 108 (90 Base) MCG/ACT inhaler Inhale 2 puffs into the lungs every 6 hours as needed for Wheezing or Shortness of Breath 18 g 3    ibuprofen (ADVIL;MOTRIN) 400

## 2024-09-02 ENCOUNTER — OFFICE VISIT (OUTPATIENT)
Age: 24
End: 2024-09-02

## 2024-09-02 VITALS
OXYGEN SATURATION: 97 % | DIASTOLIC BLOOD PRESSURE: 67 MMHG | HEART RATE: 91 BPM | TEMPERATURE: 98.2 F | SYSTOLIC BLOOD PRESSURE: 99 MMHG | BODY MASS INDEX: 24.03 KG/M2 | WEIGHT: 131.4 LBS

## 2024-09-02 DIAGNOSIS — J02.9 SORE THROAT: Primary | ICD-10-CM

## 2024-09-02 DIAGNOSIS — J06.9 UPPER RESPIRATORY TRACT INFECTION, UNSPECIFIED TYPE: ICD-10-CM

## 2024-09-02 LAB
Lab: NORMAL
PERFORMING INSTRUMENT: NORMAL
QC PASS/FAIL: NORMAL
SARS-COV-2, POC: NORMAL
STREPTOCOCCUS A RNA: NEGATIVE

## 2024-09-02 ASSESSMENT — ENCOUNTER SYMPTOMS
COUGH: 1
SHORTNESS OF BREATH: 0
WHEEZING: 0
SORE THROAT: 1
NAUSEA: 0
DIARRHEA: 0
RHINORRHEA: 1
ABDOMINAL PAIN: 0
VOMITING: 0

## 2024-09-02 NOTE — PROGRESS NOTES
Pupils: Pupils are equal, round, and reactive to light.   Cardiovascular:      Rate and Rhythm: Normal rate and regular rhythm.   Pulmonary:      Effort: Pulmonary effort is normal. No respiratory distress.      Breath sounds: Normal breath sounds.   Musculoskeletal:      Cervical back: Neck supple. No rigidity.   Lymphadenopathy:      Cervical: No cervical adenopathy.   Skin:     Findings: No rash.   Neurological:      General: No focal deficit present.      Mental Status: She is alert.           An electronic signature was used to authenticate this note.    --VERNON BAEZA MD

## 2024-10-24 ENCOUNTER — OFFICE VISIT (OUTPATIENT)
Dept: INTERNAL MEDICINE CLINIC | Age: 24
End: 2024-10-24

## 2024-10-24 VITALS
HEART RATE: 65 BPM | HEIGHT: 62 IN | WEIGHT: 140 LBS | BODY MASS INDEX: 25.76 KG/M2 | SYSTOLIC BLOOD PRESSURE: 106 MMHG | OXYGEN SATURATION: 99 % | DIASTOLIC BLOOD PRESSURE: 70 MMHG

## 2024-10-24 DIAGNOSIS — D50.8 IRON DEFICIENCY ANEMIA SECONDARY TO INADEQUATE DIETARY IRON INTAKE: ICD-10-CM

## 2024-10-24 DIAGNOSIS — K90.9 MALABSORPTION OF IRON: ICD-10-CM

## 2024-10-24 DIAGNOSIS — R42 VERTIGO: Primary | ICD-10-CM

## 2024-10-24 DIAGNOSIS — R53.82 CHRONIC FATIGUE: ICD-10-CM

## 2024-10-24 DIAGNOSIS — Z23 NEED FOR INFLUENZA VACCINATION: ICD-10-CM

## 2024-10-24 DIAGNOSIS — R00.2 PALPITATIONS: ICD-10-CM

## 2024-10-24 LAB
BASOPHILS # BLD: 0.1 K/UL (ref 0–0.2)
BASOPHILS NFR BLD: 1.3 %
DEPRECATED RDW RBC AUTO: 14.4 % (ref 12.4–15.4)
EOSINOPHIL # BLD: 0.1 K/UL (ref 0–0.6)
EOSINOPHIL NFR BLD: 2 %
FERRITIN SERPL IA-MCNC: 6.7 NG/ML (ref 15–150)
HCT VFR BLD AUTO: 38.1 % (ref 36–48)
HGB BLD-MCNC: 12.6 G/DL (ref 12–16)
IRON SATN MFR SERPL: 9 % (ref 15–50)
IRON SERPL-MCNC: 38 UG/DL (ref 37–145)
LYMPHOCYTES # BLD: 2.1 K/UL (ref 1–5.1)
LYMPHOCYTES NFR BLD: 40.1 %
MCH RBC QN AUTO: 28.4 PG (ref 26–34)
MCHC RBC AUTO-ENTMCNC: 33.1 G/DL (ref 31–36)
MCV RBC AUTO: 85.8 FL (ref 80–100)
MONOCYTES # BLD: 0.3 K/UL (ref 0–1.3)
MONOCYTES NFR BLD: 5.4 %
NEUTROPHILS # BLD: 2.7 K/UL (ref 1.7–7.7)
NEUTROPHILS NFR BLD: 51.2 %
PLATELET # BLD AUTO: 338 K/UL (ref 135–450)
PMV BLD AUTO: 8.6 FL (ref 5–10.5)
RBC # BLD AUTO: 4.44 M/UL (ref 4–5.2)
TIBC SERPL-MCNC: 417 UG/DL (ref 260–445)
WBC # BLD AUTO: 5.3 K/UL (ref 4–11)

## 2024-10-24 NOTE — ASSESSMENT & PLAN NOTE
Chronic, not currently on supplement. Checking BW.     Orders:    CBC with Auto Differential; Future    Iron and TIBC; Future    Ferritin; Future

## 2024-10-24 NOTE — ASSESSMENT & PLAN NOTE
Chronic, intermittent and well controlled on propranolol. Change medication timing to with evening meal or bed to help with daytime fatigue and brain fog.

## 2024-10-24 NOTE — PROGRESS NOTES
10/24/24     Chief Complaint   Patient presents with    Dizziness    Fatigue    Other     \"Brain fog\"   Never had pap     HPI    Here for follow up  Dizziness was improving, today she reports it is getting worse again. Dizziness is described as vertigo - off balance, worse with fast movements, spinning sensation.    She reports she is having dizziness everyday   Needing meclizine a few times times a week  Meclizine helps with vertigo symptoms   Still wearing compression and working on hydration with sports drinks.   She is not using flonase. She denies any syncope or pre-syncope.     Brain fog and fatigue have been ongoing for years, reports it is getting worse. Symptoms worsened after starting propranolol. Palpitations and tachycardia are controlled propranolol.     History of iron def anemia, previously on iron supplement. She is not currently on a supplement and has decreased her dietary supplement.   Dec 2013 she had a normal EEG at [facility] because she was zoning out in school to rule out absence seizures. 8/16/2014 she had a normal CT head due to syncope. She also report a normal echo at that time.        Allergies   Allergen Reactions    Pecan Pollen Shortness Of Breath    Citalopram Other (See Comments)    Other      Other reaction(s): Tested positive  Pecan tree and hickory tree pollen    Amoxicillin Hives and Rash     Current Outpatient Medications   Medication Sig Dispense Refill    propranolol (INDERAL LA) 60 MG extended release capsule Take 1 capsule by mouth daily 90 capsule 1    venlafaxine (EFFEXOR XR) 75 MG extended release capsule Take 1 capsule by mouth daily      fluticasone (FLONASE) 50 MCG/ACT nasal spray 2 sprays by Each Nostril route daily (Patient not taking: Reported on 9/2/2024) 16 g 0    budesonide-formoterol (SYMBICORT) 160-4.5 MCG/ACT AERO Inhale 2 puffs into the lungs 2 times daily (Patient not taking: Reported on 8/27/2024) 30.6 g 3    albuterol sulfate HFA (PROVENTIL;VENTOLIN;PROAIR)

## 2024-10-24 NOTE — ASSESSMENT & PLAN NOTE
Chronic, intermittent. Worsening. Restart flonase and use daily.  Take meclizine prn. Discussed steroids for possible eustachian tube dysfunction.  Referral placed for PT/vestibular rehab.    Orders:    Ambulatory referral to Physical Therapy

## 2024-11-05 ENCOUNTER — HOSPITAL ENCOUNTER (OUTPATIENT)
Dept: PHYSICAL THERAPY | Age: 24
Setting detail: THERAPIES SERIES
Discharge: HOME | End: 2024-11-05
Payer: COMMERCIAL

## 2024-11-05 DIAGNOSIS — H83.2X3 VESTIBULAR HYPOFUNCTION OF BOTH EARS: ICD-10-CM

## 2024-11-05 DIAGNOSIS — R26.89 IMBALANCE: Primary | ICD-10-CM

## 2024-11-05 DIAGNOSIS — R42 DIZZINESS: ICD-10-CM

## 2024-11-05 PROCEDURE — 97112 NEUROMUSCULAR REEDUCATION: CPT

## 2024-11-05 PROCEDURE — 97530 THERAPEUTIC ACTIVITIES: CPT

## 2024-11-05 PROCEDURE — 97161 PT EVAL LOW COMPLEX 20 MIN: CPT

## 2024-11-05 NOTE — PLAN OF CARE
[facility] [address], [place] [address] office: [phone] fax: [phone]     Physical Therapy Initial Evaluation Certification      Dear DELIO Dye -*,    We had the pleasure of evaluating the following patient for physical therapy services at [facility].  A summary of our findings can be found in the initial assessment below.  This includes our plan of care.  If you have any questions or concerns regarding these findings, please do not hesitate to contact me at the office phone number listed above.  Thank you for the referral.     Physician Signature:_______________________________Date:__________________  By signing above (or electronic signature), therapist’s plan is approved by physician       Physical Therapy: TREATMENT/PROGRESS NOTE   Patient: Melisa Lopez (24 y.o. female)   Examination Date: 2024   :  2000 MRN: [id number]   Visit #: 1   Insurance Allowable Auth Needed   [place] []Yes    [x]No    Insurance: Payor: [employer] / Plan: [employer] / Product Type: *No Product type* /   Insurance ID: [id number] - (Commercial)  Secondary Insurance (if applicable):    Treatment Diagnosis:     ICD-10-CM    1. Imbalance  R26.89       2. Dizziness  R42       3. Vestibular hypofunction of both ears  H83.2X3          Medical Diagnosis:  Vertigo [R42]   Referring Physician: Bonnie Arciniega APRN *  PCP: Bonnie Arciniega APRN - CNP       Plan of care signed (Y/N):     Date of Patient follow up with Physician:      Plan of Care Report: EVAL today  POC update due: (10 visits /OR AUTH LIMITS, whichever is less)  2024                                             Medical History:  Comorbidities:  Other: FRAIRE, Tachycardia, ADHD, SOB  Relevant Medical History:                                          Precautions/ Contra-indications:           Latex allergy:  NO  Pacemaker:

## 2024-11-12 ENCOUNTER — HOSPITAL ENCOUNTER (OUTPATIENT)
Dept: PHYSICAL THERAPY | Age: 24
Setting detail: THERAPIES SERIES
Discharge: HOME | End: 2024-11-12
Payer: COMMERCIAL

## 2024-11-12 PROCEDURE — 97112 NEUROMUSCULAR REEDUCATION: CPT

## 2024-11-12 NOTE — FLOWSHEET NOTE
6.Normal Stance on Foam EC 20s S   7.Fakuda Stepping test 20s drifting forward    If 6 and 7 are positive, there is a 95% sensitivity for UVL    Balance:  [] WNL      [] NT       [x] Dysfunction noted  Comment:     See above    Gait Testing:   tested  Level of Assistance needed:  Independent  Gait Deviations (firm surface/linoleum):    None  Assistive Device Used:  No AD    Positional Testing    Nystagmus Direction Duration Vertigo Duration   Right Loaded [place] Hallpike Normal Only dizziness in each canal no nystagmus     Left Loaded Lucien Hallpike N      Right Sidelying Test N      Left Sidelying Test N      Right Roll Test N      Left Roll Test N                 Exercises/Interventions     Therapeutic Ex (12307)  Resistance Sets/time Reps Notes/Cues/Progressions   Treadmill  1.7 mph 4'  Mild dizziness                                NMR re-education (62826)       MIKAEL SOP       Sways : A/P, M/L   10 Mild dizziness           Therapeutic Activity (63381)                            Manual Intervention (49137) Time:                 Canalith Repositioning Technique (20051)             Modalities:    No modalities applied this session    Education/Home Exercise Program: Access Code: [email or web address]  URL: [email or web address]  Date: 11/05/2024  Prepared by: Raleigh Wang    Exercises  - Seated Gaze Stabilization with Head Nod  - 1 x daily - 7 x weekly - 3 sets - 10 reps  - Seated Gaze Stabilization with Head Rotation  - 1 x daily - 7 x weekly - 3 sets - 10 reps      ASSESSMENT     Today's Assessment:  Session began late. Focused on habituation and balance. Pt presented with mild dizziness with body sways in all planes.  She noticeable imbalance and dizziness with tandem stance. Educated pt on the importance of central compensation. Will progress as tolerated.      Medical Necessity Documentation:  I certify that this patient meets the below criteria necessary for medical necessity for care and/or justification of therapy

## 2024-11-15 ENCOUNTER — HOSPITAL ENCOUNTER (OUTPATIENT)
Dept: PHYSICAL THERAPY | Age: 24
Setting detail: THERAPIES SERIES
Discharge: HOME | End: 2024-11-15
Payer: COMMERCIAL

## 2024-11-15 PROCEDURE — 97112 NEUROMUSCULAR REEDUCATION: CPT

## 2024-11-15 PROCEDURE — 97140 MANUAL THERAPY 1/> REGIONS: CPT

## 2024-11-15 PROCEDURE — 97110 THERAPEUTIC EXERCISES: CPT

## 2024-11-15 NOTE — FLOWSHEET NOTE
listed.    [] Progression is slowed due to complexities/Impairments listed.  [] Progression has been slowed due to co-morbidities.  [x] Plan just implemented, too soon (<30days) to assess goals progression   [] Goals require adjustment due to lack of progress  [] Patient is not progressing as expected and requires additional follow up with physician  [] Other:     TREATMENT PLAN     Frequency/Duration: 2x/week for 5 weeks for the following treatment interventions:    Interventions:  Therapeutic Exercise (31014) including: strength training, ROM, and functional mobility  Therapeutic Activities (85652) including: functional mobility training and education.  Neuromuscular Re-education (62423) activation and proprioception, including postural re-education.    Gait Training (91922) for normalization of ambulation patterns and AD training.   Manual Therapy (80871) as indicated to include: Passive Range of Motion, Gr I-IV mobilizations, Grade V Manipulation, Soft Tissue Mobilization, Trigger Point Release, and Myofascial Release  Modalities as needed that may include: Cryotherapy, Electrical Stimulation, Biofeedback, Thermal Agents, and Ultrasound  Patient education on joint protection, postural re-education, activity modification, progression of HEP, and vestibular fuction/BPPV  CRP for assessment, treatment and education of BPPV    Plan:  habituation and functional training     Electronically Signed by Raleigh Wang PT  Date: 11/15/2024     Note: Portions of this note have been templated and/or copied from initial evaluation, reassessments and prior notes for documentation efficiency.    Note: If patient does not return for scheduled/recommended follow up visits, this note will serve as a discharge from care along with the most recent update on progress.    Vestibular Evaluation

## 2024-11-19 ENCOUNTER — HOSPITAL ENCOUNTER (OUTPATIENT)
Dept: PHYSICAL THERAPY | Age: 24
Setting detail: THERAPIES SERIES
Discharge: HOME | End: 2024-11-19
Payer: COMMERCIAL

## 2024-11-19 PROCEDURE — 97110 THERAPEUTIC EXERCISES: CPT

## 2024-11-19 PROCEDURE — 97112 NEUROMUSCULAR REEDUCATION: CPT

## 2024-11-19 NOTE — FLOWSHEET NOTE
procedure, 1 or more areas, each 15 minutes; neuromuscular reeducation of movement, balance, coordination, kinesthetic sense, posture, and/or proprioception for sitting and/or standing activities  (41017) MANUAL THERAPY -  Manual therapy techniques, 1 or more regions, each 15 minutes (Mobilization/manipulation, manual lymphatic drainage, manual traction) for the purpose of modulating pain, promoting relaxation,  increasing ROM, reducing/eliminating soft tissue swelling/inflammation/restriction, improving soft tissue extensibility and allowing for proper ROM for normal function with self care, mobility, lifting and ambulation      GOALS     Patient stated goal: To improve balance and dizziness  Status:  [] Progressing: [] Met: [] Not Met: [] Adjusted    Therapist goals for Patient:   Short Term Goals: To be achieved in: 2 weeks  Independent in HEP and progression per patient tolerance, in order to progress toward full function.    Status: [] Progressing: [] Met: [] Not Met: [] Adjusted  Patient's subjective complaint of dizziness/imbalance/symptoms to decrease by 50% to tolerate functional activities.   Status: [] Progressing: [] Met: [] Not Met: [] Adjusted    Long Term Goals: To be achieved in: 5 weeks / DC   DHI score of 30 or less to assist with return to prior level of function.    Status: [] Progressing: [] Met: [] Not Met: [] Adjusted  Patient will return to turning around and looking up without increased symptoms or restriction to work towards return to prior level of function.        Status: [] Progressing: [] Met: [] Not Met: [] Adjusted  Patient will perform normal ADLs without symptoms 75% of the time.            Status: [] Progressing: [] Met: [] Not Met: [] Adjusted  Patient will resume normal work/leisure activities without symptoms 75% of the time.            Status: [] Progressing: [] Met: [] Not Met: [] Adjusted  Patient will improve Muir sensory organization/Modified CTSIB score to 5/7 in order to

## 2024-11-22 ENCOUNTER — HOSPITAL ENCOUNTER (OUTPATIENT)
Dept: PHYSICAL THERAPY | Age: 24
Setting detail: THERAPIES SERIES
Discharge: HOME | End: 2024-11-22
Payer: COMMERCIAL

## 2024-11-22 PROCEDURE — 97112 NEUROMUSCULAR REEDUCATION: CPT

## 2024-11-22 PROCEDURE — 97110 THERAPEUTIC EXERCISES: CPT

## 2024-11-22 NOTE — FLOWSHEET NOTE
templated and/or copied from initial evaluation, reassessments and prior notes for documentation efficiency.    Note: If patient does not return for scheduled/recommended follow up visits, this note will serve as a discharge from care along with the most recent update on progress.    Vestibular Evaluation

## 2024-11-26 ENCOUNTER — HOSPITAL ENCOUNTER (OUTPATIENT)
Dept: PHYSICAL THERAPY | Age: 24
Setting detail: THERAPIES SERIES
Discharge: HOME | End: 2024-11-26
Payer: COMMERCIAL

## 2024-11-26 PROCEDURE — 97110 THERAPEUTIC EXERCISES: CPT

## 2024-11-26 PROCEDURE — 97112 NEUROMUSCULAR REEDUCATION: CPT

## 2024-11-26 PROCEDURE — 97140 MANUAL THERAPY 1/> REGIONS: CPT

## 2024-11-26 NOTE — FLOWSHEET NOTE
Independent  Gait Deviations (firm surface/linoleum):    None  Assistive Device Used:  No AD    Positional Testing    Nystagmus Direction Duration Vertigo Duration   Right Loaded [place]ldon Hallpike Normal Only dizziness in each canal no nystagmus     Left Loaded Sheldon Hallpike N      Right Sidelying Test N      Left Sidelying Test N      Right Roll Test N      Left Roll Test N                 Exercises/Interventions     Therapeutic Ex (27927)  Resistance Sets/time Reps Notes/Cues/Progressions   Treadmill  1.7 mph  11/22, dizziness 3/1011/19 Slight light headedness   HSS  Hip flexion   2/20' 2/20' 11/22, 11/19   Nustep warmup   5'  11/26 3/10 dizziness after exercise                 NMR re-education (40623)       MIKAEL SOP       VOR  V/H  Reading strobe cards        Airex:  NBOS EC  Head nods/turns  Half stance on Airex - contralateral  LE on floor - w/ slow head turns       2/30'       5x  11/15    Dizziness    Eyes to head movement    11/19 3/10 dizziness    Sways : A/P, M/L EO/ EC   10 11/22 2-3.5/10 dizziness 11/19 unstable 2/3/10 Mild dizziness    Gait:  Head turns     25    Tandem stance on  2/15'  11/26 11/22  moderate  imbalance No dizziness           Therapeutic Activity (84316)                            Manual Intervention (41591) Time: 10'   STM, SOR, gentle side glides, cervical distraction             Canalith Repositioning Technique (23605)             Modalities:    No modalities applied this session    Education/Home Exercise Program: Access Code: [email or web address]  Access Code: [email or web address]  URL: [email or web address]  Date: 11/22/2024  Prepared by: Raleigh Wang    Exercises  - Tandem Stance  - 1 x daily - 7 x weekly - 1 sets - 2 reps - 20s hold  - Seated to Fold Over Vestibular Habituation  - 1 x daily - 7 x weekly - 1 sets - 5 reps  - Standing Single Leg Stance with Counter Support  - 1 x daily - 7 x weekly - 1 sets - 2 reps - 15s hold  URL: [email or web address]  Date: 11/05/2024  Prepared by:

## 2024-11-29 ENCOUNTER — HOSPITAL ENCOUNTER (OUTPATIENT)
Dept: PHYSICAL THERAPY | Age: 24
Setting detail: THERAPIES SERIES
Discharge: HOME | End: 2024-11-29
Payer: COMMERCIAL

## 2024-11-29 PROCEDURE — 97140 MANUAL THERAPY 1/> REGIONS: CPT

## 2024-11-29 PROCEDURE — 97112 NEUROMUSCULAR REEDUCATION: CPT

## 2024-11-29 PROCEDURE — 97110 THERAPEUTIC EXERCISES: CPT

## 2024-11-29 NOTE — FLOWSHEET NOTE
re-education, activity modification, progression of HEP, and vestibular fuction/BPPV  CRP for assessment, treatment and education of BPPV    Plan:  habituation, manual  and functional training     Electronically Signed by Raleigh Wang, EFRAIN  Date: 11/29/2024     Note: Portions of this note have been templated and/or copied from initial evaluation, reassessments and prior notes for documentation efficiency.    Note: If patient does not return for scheduled/recommended follow up visits, this note will serve as a discharge from care along with the most recent update on progress.    Vestibular Evaluation

## 2024-12-03 ENCOUNTER — HOSPITAL ENCOUNTER (OUTPATIENT)
Dept: PHYSICAL THERAPY | Age: 24
Setting detail: THERAPIES SERIES
Discharge: HOME | End: 2024-12-03
Payer: COMMERCIAL

## 2024-12-03 PROCEDURE — 97110 THERAPEUTIC EXERCISES: CPT

## 2024-12-03 PROCEDURE — 97530 THERAPEUTIC ACTIVITIES: CPT

## 2024-12-03 PROCEDURE — 97112 NEUROMUSCULAR REEDUCATION: CPT

## 2024-12-03 NOTE — FLOWSHEET NOTE
sitting and/or standing activities  (67548) THERAPEUTIC ACTIVITY - use of dynamic activities to improve functional performance. (Ex include squatting, ascending/descending stairs, walking, bending, lifting, catching, throwing, pushing, pulling, jumping.)  Direct, one on one contact, billed in 15-minute increments.      GOALS     Patient stated goal: To improve balance and dizziness  Status:  [] Progressing: [] Met: [] Not Met: [] Adjusted    Therapist goals for Patient:   Short Term Goals: To be achieved in: 2 weeks  Independent in HEP and progression per patient tolerance, in order to progress toward full function.    Status: [x] Progressing: [] Met: [] Not Met: [] Adjusted  Patient's subjective complaint of dizziness/imbalance/symptoms to decrease by 50% to tolerate functional activities.   Status: [x] Progressing: [] Met: [] Not Met: [] Adjusted    Long Term Goals: To be achieved in: 5 weeks / DC   DHI score of 30 or less to assist with return to prior level of function.    Status: [x] Progressing: [] Met: [] Not Met: [] Adjusted  Patient will return to turning around and looking up without increased symptoms or restriction to work towards return to prior level of function.        Status: [x] Progressing: [] Met: [] Not Met: [] Adjusted  Patient will perform normal ADLs without symptoms 75% of the time.            Status: [x] Progressing: [] Met: [] Not Met: [] Adjusted  Patient will resume normal work/leisure activities without symptoms 75% of the time.            Status: [x] Progressing: [] Met: [] Not Met: [] Adjusted  Patient will improve Manjit sensory organization/Modified CTSIB score to 5/7 in order to reduce fall risk.           Status: [x] Progressing: [] Met: [] Not Met: [] Adjusted    Overall Progression Towards Functional goals/ Treatment Progress Update:  [] Patient is progressing as expected towards functional goals listed.    [] Progression is slowed due to complexities/Impairments listed.  []

## 2024-12-10 ENCOUNTER — HOSPITAL ENCOUNTER (OUTPATIENT)
Dept: PHYSICAL THERAPY | Age: 24
Setting detail: THERAPIES SERIES
Discharge: HOME | End: 2024-12-10
Payer: COMMERCIAL

## 2024-12-10 PROCEDURE — 97110 THERAPEUTIC EXERCISES: CPT

## 2024-12-10 PROCEDURE — 97112 NEUROMUSCULAR REEDUCATION: CPT

## 2024-12-10 NOTE — FLOWSHEET NOTE
Stability / VSR: 12/3/2024    Test Position Time Result   1.Normal Stance EO 20s S   2.Normal Stance EC 20s S   3.Staggered Stance EO 20s S   4.Stagger Stance EC 20s S   5.Normal Stance on Foam EO 20s  F   6.Normal Stance on Foam EC 20s S   7.Fakuda Stepping test 20s drifting forward    If 6 and 7 are positive, there is a 95% sensitivity for UVL    Balance:  [] WNL      [] NT       [x] Dysfunction noted  Comment:     See above    Gait Testing:   tested  Level of Assistance needed:  Independent  Gait Deviations (firm surface/linoleum):    None  Assistive Device Used:  No AD    Positional Testing    Nystagmus Direction Duration Vertigo Duration   Right Loaded Lucien Hallpike Normal Only dizziness in each canal no nystagmus     Left Loaded [place] Hallpike N      Right Sidelying Test N      Left Sidelying Test N      Right Roll Test N      Left Roll Test N                 Exercises/Interventions     Therapeutic Ex (94471)  Resistance Sets/time Reps Notes/Cues/Progressions   Treadmill  1.7 mph  11/22, dizziness 3/1011/19 Slight light headedness   IB/ HR  2/30', 2/10  12/3   HSS  Hip flexion   2/20' 2/20' 11/29, 11/22, 11/19   Nustep warmup   Bike warmup  5'  11/29, 11/26 3/10 dizziness after exercise                 NMR re-education (02767)       MIKAEL SOP       VOR  V/H  Reading strobe cards        Airex:  NBOS EC  Head nods/turns  Half stance on Airex - contralateral  LE on floor - w/ slow head turns     2/30'  2       5x HN/HT    11/29 3/10  Dizziness   11/29   Eyes to head movement    11/19 3/10 dizziness    Sways : A/P, M/L EO/ EC   10 11/22 2-3.5/10 dizziness 11/19 unstable 2/3/10 Mild dizziness    Toe taps on cone   2 10 12/3   3PD   - tapping  - visual      12/3   Gait:  Head turns     25ftx 2    Tandem stance on airex  2/30'  12/3,  11/29 mild to moderate imbalance   11/26 11/22  moderate  imbalance No dizziness           Therapeutic Activity (43482)       Reassessment    12/3                 Manual Intervention

## 2024-12-13 ENCOUNTER — HOSPITAL ENCOUNTER (OUTPATIENT)
Dept: PHYSICAL THERAPY | Age: 24
Setting detail: THERAPIES SERIES
Discharge: HOME | End: 2024-12-13
Payer: COMMERCIAL

## 2024-12-13 ENCOUNTER — APPOINTMENT (OUTPATIENT)
Dept: PHYSICAL THERAPY | Age: 24
End: 2024-12-13
Payer: COMMERCIAL

## 2024-12-13 PROCEDURE — 97140 MANUAL THERAPY 1/> REGIONS: CPT

## 2024-12-13 PROCEDURE — 97112 NEUROMUSCULAR REEDUCATION: CPT

## 2024-12-13 NOTE — FLOWSHEET NOTE
[facility] - Outpatient Rehabilitation and Therapy [address], [place] [address] office: [phone] fax: [phone]       Physical Therapy: TREATMENT/PROGRESS NOTE   Patient: Melisa Lopez (24 y.o. female)   Examination Date: 2024   :  2000 MRN: [id number]   Visit #: 10   Insurance Allowable Auth Needed   MN []Yes    [x]No    Insurance: Payor: Code Rebel / Plan: [employer] / Product Type: *No Product type* /   Insurance ID: [id number] - (Commercial)  Secondary Insurance (if applicable): CIGNA   Treatment Diagnosis:     ICD-10-CM    1. Imbalance  R26.89       2. Dizziness  R42       3. Vestibular hypofunction of both ears  H83.2X3          Medical Diagnosis:  Vertigo [R42]   Referring Physician: Bonnie Arciniega APRN *  PCP: Bonnie Arciniega APRN - CNP       Plan of care signed (Y/N):     Date of Patient follow up with Physician: TBT      Plan of Care Report: NO  POC update due: (10 visits /OR AUTH LIMITS, whichever is less)  1/3/2024                                             Medical History:  Comorbidities:  Other: FRAIRE, Tachycardia, ADHD, SOB  Relevant Medical History:                                          Precautions/ Contra-indications:  Possibly symptoms of FRAIRE          Latex allergy:  NO  Pacemaker:    NO  Contraindications for Manipulation: None  Date of Surgery: NA  Other:    Red Flags:  None  Syncope    Suicide Screening:   The patient did not verbalize a primary behavioral concern, suicidal ideation, suicidal intent, or demonstrate suicidal behaviors.    Preferred Language for Healthcare:   [x] English       [] other:    SUBJECTIVE EXAMINATION     Patient stated complaint/comment: :  Patient has had complaints of dizziness / HR variability . Dizziness 1.5/10     12/10: pt reports that the elevator is out and that made her dizziness a little worse today.      12/3:  Patient feels like exerting energy trying to stay warm at  work due to the

## 2024-12-17 ENCOUNTER — HOSPITAL ENCOUNTER (OUTPATIENT)
Dept: PHYSICAL THERAPY | Age: 24
Setting detail: THERAPIES SERIES
Discharge: HOME | End: 2024-12-17
Payer: COMMERCIAL

## 2024-12-17 PROCEDURE — 97112 NEUROMUSCULAR REEDUCATION: CPT

## 2024-12-17 PROCEDURE — 97110 THERAPEUTIC EXERCISES: CPT

## 2024-12-17 NOTE — FLOWSHEET NOTE
Manual (07378)    Estim Unattended (89129)     Ther. Act (96137)    Mech. Traction (59599)     Gait (95458)    Dry Needle 1-2 muscle (90032)     Aquatic Therex (46744)    Dry Needle 3+ muscle (20561)     Iontophoresis (62647)    VASO (81266)     Ultrasound (34538)    Group Therapy (94591)     Estim Attended (78778)    Canalith Repositioning (86533)     Physical Performance Test (01263)         Other:    Other:    Total Timed Code Tx Minutes 25 2       Total Treatment Minutes 25        Charge Justification:  (06115) NEUROMUSCULAR RE-EDUCATION - Therapeutic procedure, 1 or more areas, each 15 minutes; neuromuscular reeducation of movement, balance, coordination, kinesthetic sense, posture, and/or proprioception for sitting and/or standing activities  (00355) MANUAL THERAPY -  Manual therapy techniques, 1 or more regions, each 15 minutes (Mobilization/manipulation, manual lymphatic drainage, manual traction) for the purpose of modulating pain, promoting relaxation,  increasing ROM, reducing/eliminating soft tissue swelling/inflammation/restriction, improving soft tissue extensibility and allowing for proper ROM for normal function with self care, mobility, lifting and ambulation      GOALS     Patient stated goal: To improve balance and dizziness  Status:  [] Progressing: [] Met: [] Not Met: [] Adjusted    Therapist goals for Patient:   Short Term Goals: To be achieved in: 2 weeks  Independent in HEP and progression per patient tolerance, in order to progress toward full function.    Status: [x] Progressing: [] Met: [] Not Met: [] Adjusted  Patient's subjective complaint of dizziness/imbalance/symptoms to decrease by 50% to tolerate functional activities.   Status: [x] Progressing: [] Met: [] Not Met: [] Adjusted    Long Term Goals: To be achieved in: 5 weeks / DC   DHI score of 30 or less to assist with return to prior level of function.    Status: [x] Progressing: [] Met: [] Not Met: [] Adjusted  Patient will return

## 2024-12-20 ENCOUNTER — HOSPITAL ENCOUNTER (OUTPATIENT)
Dept: PHYSICAL THERAPY | Age: 24
Setting detail: THERAPIES SERIES
Discharge: HOME | End: 2024-12-20
Payer: COMMERCIAL

## 2024-12-20 PROCEDURE — 97110 THERAPEUTIC EXERCISES: CPT

## 2024-12-20 PROCEDURE — 97140 MANUAL THERAPY 1/> REGIONS: CPT

## 2024-12-20 PROCEDURE — 97112 NEUROMUSCULAR REEDUCATION: CPT

## 2024-12-20 NOTE — FLOWSHEET NOTE
[facility] - Outpatient Rehabilitation and Therapy [address], [place] [address] office: [phone] fax: [phone]       Physical Therapy: TREATMENT/PROGRESS NOTE   Patient: Melisa Lopez (24 y.o. female)   Examination Date: 2024   :  2000 MRN: [id number]   Visit #: 12   additional 6 visits   Insurance Allowable Auth Needed   MN []Yes    [x]No    Insurance: Payor: "[employer] / Plan: [employer] / Product Type: *No Product type* /   Insurance ID: [id number] - (Commercial)  Secondary Insurance (if applicable): CIGNA   Treatment Diagnosis:     ICD-10-CM    1. Imbalance  R26.89       2. Dizziness  R42       3. Vestibular hypofunction of both ears  H83.2X3          Medical Diagnosis:  Vertigo [R42]   Referring Physician: Bonnie Arciniega APRN *  PCP: Bonnie Arciniega APRN - CNP       Plan of care signed (Y/N):     Date of Patient follow up with Physician: TBT      Plan of Care Report: NO  POC update due: (10 visits /OR AUTH LIMITS, whichever is less)  1/3/2024                                             Medical History:  Comorbidities:  Other: FRAIRE, Tachycardia, ADHD, SOB  Relevant Medical History:                                          Precautions/ Contra-indications:  Possibly symptoms of FRAIRE          Latex allergy:  NO  Pacemaker:    NO  Contraindications for Manipulation: None  Date of Surgery: NA  Other:    Red Flags:  None  Syncope    Suicide Screening:   The patient did not verbalize a primary behavioral concern, suicidal ideation, suicidal intent, or demonstrate suicidal behaviors.    Preferred Language for Healthcare:   [x] English       [] other:    SUBJECTIVE EXAMINATION     Patient stated complaint/comment: : Says the cold weather has produced some pain in her neck and joints. States that her dizziness had been 4.5/10 the start of her work day, but currently 2.5/10 at the start of therapy.     :  Patient has had complaints of dizziness / HR

## 2024-12-27 ENCOUNTER — APPOINTMENT (OUTPATIENT)
Dept: GENERAL RADIOLOGY | Age: 24
End: 2024-12-27
Payer: COMMERCIAL

## 2024-12-27 ENCOUNTER — HOSPITAL ENCOUNTER (EMERGENCY)
Age: 24
Discharge: HOME | End: 2024-12-28
Payer: COMMERCIAL

## 2024-12-27 VITALS
TEMPERATURE: 98.9 F | HEIGHT: 62 IN | OXYGEN SATURATION: 98 % | WEIGHT: 146 LBS | BODY MASS INDEX: 26.87 KG/M2 | SYSTOLIC BLOOD PRESSURE: 115 MMHG | RESPIRATION RATE: 17 BRPM | DIASTOLIC BLOOD PRESSURE: 76 MMHG | HEART RATE: 96 BPM

## 2024-12-27 DIAGNOSIS — R55 NEAR SYNCOPE: Primary | ICD-10-CM

## 2024-12-27 LAB
ALBUMIN SERPL-MCNC: 4.6 G/DL (ref 3.4–5)
ALBUMIN/GLOB SERPL: 1.2 {RATIO} (ref 1.1–2.2)
ALP SERPL-CCNC: 76 U/L (ref 40–129)
ALT SERPL-CCNC: <5 U/L (ref 10–40)
ANION GAP SERPL CALCULATED.3IONS-SCNC: 10 MMOL/L (ref 3–16)
AST SERPL-CCNC: 19 U/L (ref 15–37)
BACTERIA URNS QL MICRO: ABNORMAL /HPF
BASOPHILS # BLD: 0 K/UL (ref 0–0.2)
BASOPHILS NFR BLD: 0.3 %
BILIRUB SERPL-MCNC: <0.2 MG/DL (ref 0–1)
BILIRUB UR QL STRIP.AUTO: NEGATIVE
BUN SERPL-MCNC: 8 MG/DL (ref 7–20)
CALCIUM SERPL-MCNC: 9.3 MG/DL (ref 8.3–10.6)
CHLORIDE SERPL-SCNC: 107 MMOL/L (ref 99–110)
CLARITY UR: CLEAR
CO2 SERPL-SCNC: 24 MMOL/L (ref 21–32)
COLOR UR: YELLOW
CREAT SERPL-MCNC: 0.6 MG/DL (ref 0.6–1.1)
DEPRECATED RDW RBC AUTO: 13.7 % (ref 12.4–15.4)
EOSINOPHIL # BLD: 0.1 K/UL (ref 0–0.6)
EOSINOPHIL NFR BLD: 1.8 %
EPI CELLS #/AREA URNS AUTO: 1 /HPF (ref 0–5)
GFR SERPLBLD CREATININE-BSD FMLA CKD-EPI: >90 ML/MIN/{1.73_M2}
GLUCOSE SERPL-MCNC: 144 MG/DL (ref 70–99)
GLUCOSE UR STRIP.AUTO-MCNC: NEGATIVE MG/DL
HCG SERPL QL: NEGATIVE
HCT VFR BLD AUTO: 39.8 % (ref 36–48)
HGB BLD-MCNC: 13.2 G/DL (ref 12–16)
HGB UR QL STRIP.AUTO: ABNORMAL
HYALINE CASTS #/AREA URNS AUTO: 0 /LPF (ref 0–8)
KETONES UR STRIP.AUTO-MCNC: NEGATIVE MG/DL
LEUKOCYTE ESTERASE UR QL STRIP.AUTO: NEGATIVE
LYMPHOCYTES # BLD: 2 K/UL (ref 1–5.1)
LYMPHOCYTES NFR BLD: 33.1 %
MCH RBC QN AUTO: 28 PG (ref 26–34)
MCHC RBC AUTO-ENTMCNC: 33.1 G/DL (ref 31–36)
MCV RBC AUTO: 84.6 FL (ref 80–100)
MONOCYTES # BLD: 0.4 K/UL (ref 0–1.3)
MONOCYTES NFR BLD: 6.9 %
NEUTROPHILS # BLD: 3.5 K/UL (ref 1.7–7.7)
NEUTROPHILS NFR BLD: 57.9 %
NITRITE UR QL STRIP.AUTO: NEGATIVE
PH UR STRIP.AUTO: 7 [PH] (ref 5–8)
PLATELET # BLD AUTO: 357 K/UL (ref 135–450)
PMV BLD AUTO: 7.8 FL (ref 5–10.5)
POTASSIUM SERPL-SCNC: 3.8 MMOL/L (ref 3.5–5.1)
PROT SERPL-MCNC: 8.3 G/DL (ref 6.4–8.2)
PROT UR STRIP.AUTO-MCNC: NEGATIVE MG/DL
RBC # BLD AUTO: 4.71 M/UL (ref 4–5.2)
RBC CLUMPS #/AREA URNS AUTO: 5 /HPF (ref 0–4)
SODIUM SERPL-SCNC: 141 MMOL/L (ref 136–145)
SP GR UR STRIP.AUTO: <=1.005 (ref 1–1.03)
UA COMPLETE W REFLEX CULTURE PNL UR: ABNORMAL
UA DIPSTICK W REFLEX MICRO PNL UR: YES
URN SPEC COLLECT METH UR: ABNORMAL
UROBILINOGEN UR STRIP-ACNC: 0.2 E.U./DL
WBC # BLD AUTO: 6.1 K/UL (ref 4–11)
WBC #/AREA URNS AUTO: 1 /HPF (ref 0–5)

## 2024-12-27 PROCEDURE — 2580000003 HC RX 258: Performed by: PHYSICIAN ASSISTANT

## 2024-12-27 PROCEDURE — 71045 X-RAY EXAM CHEST 1 VIEW: CPT

## 2024-12-27 PROCEDURE — 99285 EMERGENCY DEPT VISIT HI MDM: CPT

## 2024-12-27 PROCEDURE — 85025 COMPLETE CBC W/AUTO DIFF WBC: CPT

## 2024-12-27 PROCEDURE — 93005 ELECTROCARDIOGRAM TRACING: CPT | Performed by: PHYSICIAN ASSISTANT

## 2024-12-27 PROCEDURE — 6370000000 HC RX 637 (ALT 250 FOR IP): Performed by: PHYSICIAN ASSISTANT

## 2024-12-27 PROCEDURE — 96360 HYDRATION IV INFUSION INIT: CPT

## 2024-12-27 PROCEDURE — 81001 URINALYSIS AUTO W/SCOPE: CPT

## 2024-12-27 PROCEDURE — 80053 COMPREHEN METABOLIC PANEL: CPT

## 2024-12-27 PROCEDURE — 84703 CHORIONIC GONADOTROPIN ASSAY: CPT

## 2024-12-27 RX ORDER — ONDANSETRON 4 MG/1
4 TABLET, ORALLY DISINTEGRATING ORAL ONCE
Status: COMPLETED | OUTPATIENT
Start: 2024-12-27 | End: 2024-12-27

## 2024-12-27 RX ORDER — 0.9 % SODIUM CHLORIDE 0.9 %
1000 INTRAVENOUS SOLUTION INTRAVENOUS ONCE
Status: COMPLETED | OUTPATIENT
Start: 2024-12-27 | End: 2024-12-27

## 2024-12-27 RX ADMIN — ONDANSETRON 4 MG: 4 TABLET, ORALLY DISINTEGRATING ORAL at 15:36

## 2024-12-27 RX ADMIN — SODIUM CHLORIDE 1000 ML: 9 INJECTION, SOLUTION INTRAVENOUS at 15:54

## 2024-12-27 ASSESSMENT — ENCOUNTER SYMPTOMS
NAUSEA: 0
VOMITING: 0
ABDOMINAL PAIN: 0
BACK PAIN: 0
RHINORRHEA: 0
CONSTIPATION: 0
SORE THROAT: 0
SHORTNESS OF BREATH: 0
EYE PAIN: 0
DIARRHEA: 0
COUGH: 0

## 2024-12-27 ASSESSMENT — PAIN - FUNCTIONAL ASSESSMENT: PAIN_FUNCTIONAL_ASSESSMENT: 0-10

## 2024-12-27 ASSESSMENT — LIFESTYLE VARIABLES
HOW MANY STANDARD DRINKS CONTAINING ALCOHOL DO YOU HAVE ON A TYPICAL DAY: 1 OR 2
HOW OFTEN DO YOU HAVE A DRINK CONTAINING ALCOHOL: MONTHLY OR LESS

## 2024-12-27 ASSESSMENT — PAIN SCALES - GENERAL: PAINLEVEL_OUTOF10: 0

## 2024-12-27 NOTE — ED NOTES
Patient brought to Room 29 from the [facility]. Introduced self to patient and mom at bedside. Patient oriented to room and ED throughput process.  Safety measures with ED bed locked in lowest position and call light in reach.  Patient educated on all orders, including any medications.  Patient educated on chief complaint/symptoms. Patient encouraged to ask questions regarding care, medications or treatment plan.  Patient aware of how to reach staff with questions/concerns.    Started orthostatics as ordered. See chart.

## 2024-12-27 NOTE — ED NOTES
Patient provided with discharge instructions, discussed in detail. IV removed without complications. All questions answered, family at the bedside to transport home.

## 2024-12-27 NOTE — DISCHARGE INSTRUCTIONS
Follow up with your primary care provider in one week for a recheck    Continue your home medications as prescribed.    Return to the ED if you have any worsening symptoms.
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
## 2024-12-29 LAB
EKG ATRIAL RATE: 112 BPM
EKG DIAGNOSIS: NORMAL
EKG P AXIS: 48 DEGREES
EKG P-R INTERVAL: 128 MS
EKG Q-T INTERVAL: 304 MS
EKG QRS DURATION: 84 MS
EKG QTC CALCULATION (BAZETT): 414 MS
EKG R AXIS: 50 DEGREES
EKG T AXIS: 19 DEGREES
EKG VENTRICULAR RATE: 112 BPM

## 2024-12-29 PROCEDURE — 93010 ELECTROCARDIOGRAM REPORT: CPT | Performed by: INTERNAL MEDICINE

## 2024-12-31 ENCOUNTER — HOSPITAL ENCOUNTER (OUTPATIENT)
Dept: PHYSICAL THERAPY | Age: 24
Setting detail: THERAPIES SERIES
Discharge: HOME | End: 2024-12-31
Payer: COMMERCIAL

## 2024-12-31 PROCEDURE — 97112 NEUROMUSCULAR REEDUCATION: CPT

## 2024-12-31 PROCEDURE — 97140 MANUAL THERAPY 1/> REGIONS: CPT

## 2024-12-31 PROCEDURE — 97110 THERAPEUTIC EXERCISES: CPT

## 2024-12-31 NOTE — FLOWSHEET NOTE
(76890) as indicated to include: Passive Range of Motion, Gr I-IV mobilizations, Grade V Manipulation, Soft Tissue Mobilization, Trigger Point Release, and Myofascial Release  Modalities as needed that may include: Cryotherapy, Electrical Stimulation, Biofeedback, Thermal Agents, and Ultrasound  Patient education on joint protection, postural re-education, activity modification, progression of HEP, and vestibular fuction/BPPV  CRP for assessment, treatment and education of BPPV    Plan:  habituation, manual  and functional training     Electronically Signed by Raleigh Wang PT, DPT  Date: 12/31/2024     Note: Portions of this note have been templated and/or copied from initial evaluation, reassessments and prior notes for documentation efficiency.    Note: If patient does not return for scheduled/recommended follow up visits, this note will serve as a discharge from care along with the most recent update on progress.    Vestibular Evaluation

## 2025-01-03 ENCOUNTER — HOSPITAL ENCOUNTER (OUTPATIENT)
Dept: PHYSICAL THERAPY | Age: 25
Setting detail: THERAPIES SERIES
Discharge: HOME | End: 2025-01-03
Payer: COMMERCIAL

## 2025-01-03 PROCEDURE — 97110 THERAPEUTIC EXERCISES: CPT

## 2025-01-03 PROCEDURE — 97112 NEUROMUSCULAR REEDUCATION: CPT

## 2025-01-03 NOTE — FLOWSHEET NOTE
opposition:  NT  Heel to shin (sitting or supine):      NT  Alternating Toe Tapping:       NT    Postural Stability / VSR: 12/3/2024    Test Position Time Result   1.Normal Stance EO 20s S   2.Normal Stance EC 20s S   3.Staggered Stance EO 20s S   4.Stagger Stance EC 20s S   5.Normal Stance on Foam EO 20s  F   6.Normal Stance on Foam EC 20s S   7.Fakuda Stepping test 20s drifting forward    If 6 and 7 are positive, there is a 95% sensitivity for UVL    Balance:  [] WNL      [] NT       [x] Dysfunction noted  Comment:     See above    Gait Testing:   tested  Level of Assistance needed:  Independent  Gait Deviations (firm surface/linoleum):    None  Assistive Device Used:  No AD    Positional Testing    Nystagmus Direction Duration Vertigo Duration   Right Loaded Lucien Hallpike Normal Only dizziness in each canal no nystagmus     Left Loaded [place] Hallpike N      Right Sidelying Test N      Left Sidelying Test N      Right Roll Test N      Left Roll Test N                 Exercises/Interventions     Therapeutic Ex (40869)  Resistance Sets/time Reps Notes/Cues/Progressions   Treadmill  1.7 mph  11/22, dizziness 3/1011/19 Slight light headedness   IB/ HR  2/30', 2/10  12/3   HSS  Hip flexion   2/20'  2/20'  12/31, 11/29, 11/22, 11/19   Nustep warmup   Bike warmup  5'   11/29, 11/26 3/10 dizziness after exercise                 NMR re-education (88325)       MIKAEL SOP       VOR  V/H  Reading strobe cards        Airex:  NBOS EC  Head nods/turns  Half stance on Airex - contralateral  LE on floor - w/ slow head turns     2/30'  2       5x HN/HT        12/13      Eyes to head movement    11/19 3/10 dizziness    Sways : A/P, M/L EO/ EC   10 11/22 2-3.5/10 dizziness 11/19 unstable 2/3/10 Mild dizziness    Toe taps on cone   2 10 12/3   Cable column walkouts 2.5pl  4 ea 1/3   Swiss ball:  -cw, ccw, A/P, M/L  - Alt Arm  -Head turns (slow)     20 20 12/31     3PD   - tapping  - visual imagery on swiss ball     2x reps  12/30 12/3

## 2025-01-07 ENCOUNTER — HOSPITAL ENCOUNTER (OUTPATIENT)
Dept: PHYSICAL THERAPY | Age: 25
Setting detail: THERAPIES SERIES
End: 2025-01-07
Payer: COMMERCIAL

## 2025-01-08 ENCOUNTER — OFFICE VISIT (OUTPATIENT)
Dept: INTERNAL MEDICINE CLINIC | Age: 25
End: 2025-01-08

## 2025-01-08 VITALS
BODY MASS INDEX: 27.42 KG/M2 | DIASTOLIC BLOOD PRESSURE: 70 MMHG | OXYGEN SATURATION: 98 % | SYSTOLIC BLOOD PRESSURE: 100 MMHG | WEIGHT: 149 LBS | HEART RATE: 77 BPM | HEIGHT: 62 IN

## 2025-01-08 DIAGNOSIS — D50.8 IRON DEFICIENCY ANEMIA SECONDARY TO INADEQUATE DIETARY IRON INTAKE: ICD-10-CM

## 2025-01-08 DIAGNOSIS — R00.2 PALPITATIONS: ICD-10-CM

## 2025-01-08 DIAGNOSIS — R41.89 BRAIN FOG: ICD-10-CM

## 2025-01-08 DIAGNOSIS — R53.82 CHRONIC FATIGUE: Primary | ICD-10-CM

## 2025-01-08 DIAGNOSIS — F43.23 ADJUSTMENT DISORDER WITH MIXED ANXIETY AND DEPRESSED MOOD: ICD-10-CM

## 2025-01-08 DIAGNOSIS — G47.10 HYPERSOMNIA: ICD-10-CM

## 2025-01-08 RX ORDER — VENLAFAXINE HYDROCHLORIDE 150 MG/1
150 CAPSULE, EXTENDED RELEASE ORAL DAILY
Qty: 30 CAPSULE | Refills: 0 | Status: SHIPPED | OUTPATIENT
Start: 2025-01-08

## 2025-01-08 RX ORDER — MECLIZINE HYDROCHLORIDE 25 MG/1
TABLET ORAL
COMMUNITY
Start: 2024-10-29

## 2025-01-08 SDOH — ECONOMIC STABILITY: FOOD INSECURITY: WITHIN THE PAST 12 MONTHS, YOU WORRIED THAT YOUR FOOD WOULD RUN OUT BEFORE YOU GOT MONEY TO BUY MORE.: NEVER TRUE

## 2025-01-08 SDOH — ECONOMIC STABILITY: FOOD INSECURITY: WITHIN THE PAST 12 MONTHS, THE FOOD YOU BOUGHT JUST DIDN'T LAST AND YOU DIDN'T HAVE MONEY TO GET MORE.: NEVER TRUE

## 2025-01-08 ASSESSMENT — PATIENT HEALTH QUESTIONNAIRE - PHQ9
SUM OF ALL RESPONSES TO PHQ9 QUESTIONS 1 & 2: 4
3. TROUBLE FALLING OR STAYING ASLEEP: NEARLY EVERY DAY
SUM OF ALL RESPONSES TO PHQ QUESTIONS 1-9: 18
6. FEELING BAD ABOUT YOURSELF - OR THAT YOU ARE A FAILURE OR HAVE LET YOURSELF OR YOUR FAMILY DOWN: NEARLY EVERY DAY
SUM OF ALL RESPONSES TO PHQ QUESTIONS 1-9: 18
SUM OF ALL RESPONSES TO PHQ QUESTIONS 1-9: 18
9. THOUGHTS THAT YOU WOULD BE BETTER OFF DEAD, OR OF HURTING YOURSELF: NOT AT ALL
1. LITTLE INTEREST OR PLEASURE IN DOING THINGS: NEARLY EVERY DAY
8. MOVING OR SPEAKING SO SLOWLY THAT OTHER PEOPLE COULD HAVE NOTICED. OR THE OPPOSITE, BEING SO FIGETY OR RESTLESS THAT YOU HAVE BEEN MOVING AROUND A LOT MORE THAN USUAL: NOT AT ALL
7. TROUBLE CONCENTRATING ON THINGS, SUCH AS READING THE NEWSPAPER OR WATCHING TELEVISION: NEARLY EVERY DAY
SUM OF ALL RESPONSES TO PHQ QUESTIONS 1-9: 18
10. IF YOU CHECKED OFF ANY PROBLEMS, HOW DIFFICULT HAVE THESE PROBLEMS MADE IT FOR YOU TO DO YOUR WORK, TAKE CARE OF THINGS AT HOME, OR GET ALONG WITH OTHER PEOPLE: VERY DIFFICULT
2. FEELING DOWN, DEPRESSED OR HOPELESS: SEVERAL DAYS
4. FEELING TIRED OR HAVING LITTLE ENERGY: NEARLY EVERY DAY
5. POOR APPETITE OR OVEREATING: MORE THAN HALF THE DAYS

## 2025-01-08 NOTE — ASSESSMENT & PLAN NOTE
Chronic, could be better controlled. Increase venlafaxine to 150 mg daiy for better control. Call insurance and find new psychiatry and psychology covered. Not clear what role this is playing in chronic fatigue and brain fog.     Orders:    venlafaxine (EFFEXOR XR) 150 MG extended release capsule; Take 1 capsule by mouth daily    [facility]    
 Chronic, not currently on supplement. Reviewed most recent labs and encouraged pt to restart supplement and/or increase dietary iron.             
Chronic, controlled propranolol, take nightly as discussed.      Orders:    [facility] Sleep [facility]    
Chronic, multifactorial.  Improved some now that she is taking her beta-blocker at night.  She could benefit from a sleep study, referral placed for sleep medicine.  She has a family history of KENNETH and reports snoring.  Orders:    [facility] Sleep Medicine    
Chronic, ongoing. Discussed in detail and referral placed for sleep medicine. She snores and has a family history of KENNETH, could benefit from a sleep study.       Orders:    [facility] Sleep Medicine    
hardware both wrist-/yes(specify)

## 2025-01-08 NOTE — PROGRESS NOTES
1/8/25     Chief Complaint   Patient presents with    Follow-up     12/27/24  Near syncope  [facility]         HPI    Here for ED follow up, she was seen at [facility] ED on 12/27 for lightheadedness/dizziness sensation that occurred while she was sitting at her desk at work. She took a meclizine and symptoms had improved by the time she was seen in the ED her symptoms had improved. She missed her propranolol dose the night before.  She was given a bolus of IVF in the ED. Work up included a negative pregnancy test, stable EKG, CBC, CMP noted elevated glucose, she ate on her way to the ED.      She is going to vestibular therapy.     Brain fog and fatigue have been ongoing for years, reports it is getting worse. Symptoms worsened after starting propranolol. Palpitations and tachycardia are controlled propranolol. Energy improved some when changing BB dose to evening, not resolved.  Brain fog is persistent.      History of iron def anemia, previously on iron supplement. She is not currently on a supplement and has decreased her dietary supplement. Only tolerates a certain brand supplement due to s/e of nausea. She has not purchased the iron.      Dec 2013 she had a normal EEG at [facility] because she was zoning out in school to rule out absence seizures. 8/16/2014 she had a normal CT head due to syncope. She also report a normal echo at that time.     Mood has been up and down, some days are better than others. Not seeing the therapist she was doing well with due to insurance change. She is not seeing psychiatry either due to this insurance change. She still has a refill of venlafaxine 75 mg daily.  She had been on this for a few years and is doing okay on it.     Trying to find new job and having increased stress.     She does not feel rested when waking up. Wakes up intermittently most nights, not consistently. Mom with possible KENNETH.     Allergies   Allergen Reactions    Pecan Pollen Shortness Of Breath    Citalopram Other (See

## 2025-01-09 ENCOUNTER — HOSPITAL ENCOUNTER (OUTPATIENT)
Dept: PHYSICAL THERAPY | Age: 25
Setting detail: THERAPIES SERIES
Discharge: HOME | End: 2025-01-09
Payer: COMMERCIAL

## 2025-01-09 PROCEDURE — 97110 THERAPEUTIC EXERCISES: CPT

## 2025-01-09 NOTE — FLOWSHEET NOTE
[facility] - Outpatient Rehabilitation and Therapy [address], [place] [address] office: [phone] fax: [phone]       Physical Therapy: TREATMENT/PROGRESS NOTE   Patient: Melisa Lopez (24 y.o. female)   Examination Date: 2025   :  2000 MRN: [id number]   Visit #: 15   3/6 updated POC  Insurance Allowable Auth Needed   MN []Yes    [x]No    Insurance: Payor: Intent / Plan: [employer] / Product Type: *No Product type* /   Insurance ID: [id number] - (Commercial)  Secondary Insurance (if applicable): CIGNA   Treatment Diagnosis:     ICD-10-CM    1. Imbalance  R26.89       2. Dizziness  R42       3. Vestibular hypofunction of both ears  H83.2X3          Medical Diagnosis:  Vertigo [R42]   Referring Physician: Bonnie Arciniega APRN *  PCP: Bonnie Arciniega APRN - CNP       Plan of care signed (Y/N):     Date of Patient follow up with Physician: TBT      Plan of Care Report: NO  POC update due: (10 visits /OR AUTH LIMITS, whichever is less)  1/3/2024                                             Medical History:  Comorbidities:  Other: FRAIRE, Tachycardia, ADHD, SOB  Relevant Medical History:                                          Precautions/ Contra-indications:  Possibly symptoms of FRAIRE          Latex allergy:  NO  Pacemaker:    NO  Contraindications for Manipulation: None  Date of Surgery: NA  Other:    Red Flags:  None  Syncope    Suicide Screening:   The patient did not verbalize a primary behavioral concern, suicidal ideation, suicidal intent, or demonstrate suicidal behaviors.    Preferred Language for Healthcare:   [x] English       [] other:    SUBJECTIVE EXAMINATION     Patient stated complaint/comment:  Patient says she saw her PCP yesterday . Her PCP is going to refer the patient to a sleep specialist.     1/3: Patient reports that earlier this morning her dizziness was 3/10. States that she's had more fatigue .     : The patient reports near

## 2025-01-10 ENCOUNTER — OFFICE VISIT (OUTPATIENT)
Dept: PULMONOLOGY | Age: 25
End: 2025-01-10
Payer: COMMERCIAL

## 2025-01-10 VITALS
HEART RATE: 87 BPM | DIASTOLIC BLOOD PRESSURE: 68 MMHG | OXYGEN SATURATION: 99 % | HEIGHT: 62 IN | SYSTOLIC BLOOD PRESSURE: 112 MMHG | WEIGHT: 150.2 LBS | BODY MASS INDEX: 27.64 KG/M2

## 2025-01-10 DIAGNOSIS — F41.1 GENERALIZED ANXIETY DISORDER: ICD-10-CM

## 2025-01-10 DIAGNOSIS — R53.83 FATIGUE, UNSPECIFIED TYPE: ICD-10-CM

## 2025-01-10 DIAGNOSIS — R41.89 BRAIN FOG: ICD-10-CM

## 2025-01-10 DIAGNOSIS — G47.10 HYPERSOMNIA: Primary | ICD-10-CM

## 2025-01-10 PROCEDURE — 99204 OFFICE O/P NEW MOD 45 MIN: CPT | Performed by: STUDENT IN AN ORGANIZED HEALTH CARE EDUCATION/TRAINING PROGRAM

## 2025-01-10 RX ORDER — VENLAFAXINE HYDROCHLORIDE 37.5 MG/1
37.5 CAPSULE, EXTENDED RELEASE ORAL DAILY
Qty: 15 CAPSULE | Refills: 0 | Status: SHIPPED | OUTPATIENT
Start: 2025-01-10

## 2025-01-10 ASSESSMENT — SLEEP AND FATIGUE QUESTIONNAIRES
HOW LIKELY ARE YOU TO NOD OFF OR FALL ASLEEP WHILE SITTING INACTIVE IN A PUBLIC PLACE: WOULD NEVER DOZE
HOW LIKELY ARE YOU TO NOD OFF OR FALL ASLEEP WHILE WATCHING TV: MODERATE CHANCE OF DOZING
HOW LIKELY ARE YOU TO NOD OFF OR FALL ASLEEP WHILE SITTING QUIETLY AFTER LUNCH WITHOUT ALCOHOL: SLIGHT CHANCE OF DOZING
HOW LIKELY ARE YOU TO NOD OFF OR FALL ASLEEP IN A CAR, WHILE STOPPED FOR A FEW MINUTES IN TRAFFIC: SLIGHT CHANCE OF DOZING
HOW LIKELY ARE YOU TO NOD OFF OR FALL ASLEEP WHILE SITTING AND TALKING TO SOMEONE: WOULD NEVER DOZE
HOW LIKELY ARE YOU TO NOD OFF OR FALL ASLEEP WHILE LYING DOWN TO REST IN THE AFTERNOON WHEN CIRCUMSTANCES PERMIT: HIGH CHANCE OF DOZING
ESS TOTAL SCORE: 12
HOW LIKELY ARE YOU TO NOD OFF OR FALL ASLEEP WHILE SITTING AND READING: MODERATE CHANCE OF DOZING

## 2025-01-10 NOTE — PROGRESS NOTES
[facility]  Sleep Medicine  [address], [address]  [place] [address]    Chief Complaint   Patient presents with    Fatigue     Brain fog       Melisa Lopez is a 24 y.o. female who comes in for sleep evaluation.  Her complaints include daytime fatigue, palpitations, and brain fog . Onset of symptoms was  several months ago . She has had extensive medical workup with unremarkable results so far.     Pertinent PMHx includes: palpitations, anxiety, depression, PTSD.    She goes to bed at around 9pm-11pm. She falls asleep in  20-40 minutes.  She awakens variable times per night. She awakens at around 7am. The average total amount of sleep is variable hours per night. She does not use sleep aid/s. She does take daytime naps (lasting 1-2 hours). She describes the symptoms as daytime sleepiness, fatigue, non refreshed sleep , morning headaches, morning dry mouth, difficulty with waking up with the alarm, brain fog, (unsure if she snores). Patient also reports that when she is crying or upset, her entire body \"goes numb\". She is not sure if she has hypnopompic or hypnagogic hallucinations but does sometimes experience feeling liek falling when falling asleep. She denies any recent sleep paralysis. Not sure of hx of brain trauma and no hx of CNS infections. She does have symptoms consistent / suggestive of restless legs syndrome (every night). She has not felt extremely sleepy while driving. She also reports recent significant weight gain of 20 lbs in the past couple of years. Previous evaluation and treatment has included: none.    Family hx of sleep disorders: not to patient's knowledge  Caffeinated drinks/day: 1 coffee or 1 large soda  Alcohol intake/day/week: a few drinks per week  Occupation: call center       DATA REVIEWED TODAY:  [place] & Insomnia Severity Index scores      1/10/2025     9:56 AM   Sleep Medicine   Sitting and reading 2   Watching TV 2   Sitting, inactive in a public place

## 2025-01-10 NOTE — PATIENT INSTRUCTIONS
In preparation of the sleep study (PSG/MSLT):    Continue taking Effexor until you reach 6 weeks prior to the sleep study.  Then spend the next 2 weeks taking Effexor ER 75 mg.  Then spend the next 2 weeks taking Effexor ER 37.5 mg.  Stop taking Effexor the remaining 2 weeks leading to the study.  Restart Effexor as soon as study is done.  Complete 2 week sleep diary before the test. Take the diary with you to the sleep lab.  Urine drug screen should be done 2-3 weeks before the sleep test.     Drowsy Driving Tips    These suggestions will help prevent you from the risk of drowsy driving.    1. If you feel tired or drowsy do not drive.  Sleepiness is a major cause of motor vehicle accidents and accounts for 40% of all fatal crashes reported on the [facility].  No matter how much you think you can control sleepiness, you can't.    2. Ensure you follow your doctor's advice about the treatment for your sleep disorder. For example, if you have sleep apnea and use CPAP, ensure you use it fully the night before your trip.    3. Get a good night's sleep before driving.  Do not reduce your sleep time if you plan a long drive the next day.  Get to bed early and do not stay up late packing.    4. Avoid alcohol both the night before your trip and the during your trip.  Alcohol will disrupt sleep and make you more tired the next day.  Sleepiness and alcohol are additive in increasing impairment of your driving ability.    5. Avoid any sedative medications, including sedative antihistamines that are often contained in cold or allergy medications, the night before you drive as they may have long lasting effects the next day.    6. Travel during non-sleeping hours.  Accidents due to sleepiness are more common during the nighttime hours.    7. If sleepy, stop and rest.  Drink coffee, walk around or have a brief nap in your car if you are sleepy.  Have a 10-15 minute break after every 2 hours of driving.    8. Drive with

## 2025-01-13 ENCOUNTER — TELEPHONE (OUTPATIENT)
Dept: SLEEP CENTER | Age: 25
End: 2025-01-13

## 2025-01-13 NOTE — TELEPHONE ENCOUNTER
Called to schedule a PSG / mslt per Bharath     Left vm for the pt to return my call     [facility] insurance

## 2025-01-14 ENCOUNTER — HOSPITAL ENCOUNTER (OUTPATIENT)
Dept: PHYSICAL THERAPY | Age: 25
Setting detail: THERAPIES SERIES
Discharge: HOME | End: 2025-01-14
Payer: COMMERCIAL

## 2025-01-14 PROCEDURE — 97140 MANUAL THERAPY 1/> REGIONS: CPT

## 2025-01-14 PROCEDURE — 97530 THERAPEUTIC ACTIVITIES: CPT

## 2025-01-14 PROCEDURE — 97112 NEUROMUSCULAR REEDUCATION: CPT

## 2025-01-14 PROCEDURE — 97110 THERAPEUTIC EXERCISES: CPT

## 2025-01-14 NOTE — FLOWSHEET NOTE
[] Met: [] Not Met: [] Adjusted    Overall Progression Towards Functional goals/ Treatment Progress Update:  [] Patient is progressing as expected towards functional goals listed.    [] Progression is slowed due to complexities/Impairments listed.  [] Progression has been slowed due to co-morbidities.  [x] Plan just implemented, too soon (<30days) to assess goals progression   [] Goals require adjustment due to lack of progress  [] Patient is not progressing as expected and requires additional follow up with physician  [] Other:     TREATMENT PLAN     Frequency/Duration: 2x/week for 5 weeks for the following treatment interventions:    Interventions:  Therapeutic Exercise (45760) including: strength training, ROM, and functional mobility  Therapeutic Activities (22896) including: functional mobility training and education.  Neuromuscular Re-education (09814) activation and proprioception, including postural re-education.    Gait Training (42445) for normalization of ambulation patterns and AD training.   Manual Therapy (84111) as indicated to include: Passive Range of Motion, Gr I-IV mobilizations, Grade V Manipulation, Soft Tissue Mobilization, Trigger Point Release, and Myofascial Release  Modalities as needed that may include: Cryotherapy, Electrical Stimulation, Biofeedback, Thermal Agents, and Ultrasound  Patient education on joint protection, postural re-education, activity modification, progression of HEP, and vestibular fuction/BPPV  CRP for assessment, treatment and education of BPPV    Plan:  habituation, manual  and functional training     Electronically Signed by Raleigh Wang PT, DPT  Date: 01/14/2025     Note: Portions of this note have been templated and/or copied from initial evaluation, reassessments and prior notes for documentation efficiency.    Note: If patient does not return for scheduled/recommended follow up visits, this note will serve as a discharge from care along with the most recent

## 2025-01-17 ENCOUNTER — HOSPITAL ENCOUNTER (OUTPATIENT)
Dept: PHYSICAL THERAPY | Age: 25
Setting detail: THERAPIES SERIES
Discharge: HOME | End: 2025-01-17
Payer: COMMERCIAL

## 2025-01-17 PROCEDURE — 97112 NEUROMUSCULAR REEDUCATION: CPT

## 2025-01-17 PROCEDURE — 97110 THERAPEUTIC EXERCISES: CPT

## 2025-01-17 NOTE — PLAN OF CARE
[facility] - Outpatient Rehabilitation and Therapy [address], [place] [address] office: [phone] fax: [phone]  Physical Therapy Re-Certification Plan of Care    Dear DELIO Dye -*  ,    We had the pleasure of treating the following patient for physical therapy services at [facility]. A summary of our findings can be found in the updated assessment below.  This includes our plan of care.  If you have any questions or concerns regarding these findings, please do not hesitate to contact me at the office phone number checked above.  Thank you for the referral.     Physician Signature:________________________________Date:__________________  By signing above (or electronic signature), therapist's plan is approved by physician      Total Visits: 17     Overall Response to Treatment:  The patient has made reasonable progress during this cert period. She managed to get back to baseline dizziness over the last 2 weeks of -3/10 without excerbation or activities. This past week she tolerated gradual progression of endurance  and habituation activities. She learning to manage some of dizziness with preferred activities vs demanding/ stressful activities. She will benefit from further PT to build confidence in exertional activities and improvement with dizziness.        Recommendation:    [x] Continue PT 2x / wk for 3 weeks.   [] Hold PT, pending MD visit   [] Discharge to [facility]. Follow up with PT or MD PRN.        Physical Therapy: TREATMENT/PROGRESS NOTE   Patient: Melisa Lopez (24 y.o. female)   Examination Date: 2025   :  2000 MRN: [id number]   Visit #: 17     Insurance Allowable Auth Needed   [place] []Yes    [x]No    Insurance: Payor: [employer] / Plan: [employer] / Product Type: *No Product type* /   Insurance ID: [id number] - (Commercial)  Secondary Insurance (if applicable):    Treatment Diagnosis:     ICD-10-CM    1.

## 2025-01-21 ENCOUNTER — HOSPITAL ENCOUNTER (OUTPATIENT)
Dept: PHYSICAL THERAPY | Age: 25
Setting detail: THERAPIES SERIES
Discharge: HOME | End: 2025-01-21
Payer: COMMERCIAL

## 2025-01-21 PROCEDURE — 97110 THERAPEUTIC EXERCISES: CPT

## 2025-01-21 PROCEDURE — 97112 NEUROMUSCULAR REEDUCATION: CPT

## 2025-01-21 NOTE — FLOWSHEET NOTE
Progressing: [] Met: [] Not Met: [] Adjusted    Overall Progression Towards Functional goals/ Treatment Progress Update:  [] Patient is progressing as expected towards functional goals listed.    [] Progression is slowed due to complexities/Impairments listed.  [] Progression has been slowed due to co-morbidities.  [x] Plan just implemented, too soon (<30days) to assess goals progression   [] Goals require adjustment due to lack of progress  [] Patient is not progressing as expected and requires additional follow up with physician  [] Other:     TREATMENT PLAN     Frequency/Duration: 2x/week for 5 weeks for the following treatment interventions:    Interventions:  Therapeutic Exercise (60545) including: strength training, ROM, and functional mobility  Therapeutic Activities (86198) including: functional mobility training and education.  Neuromuscular Re-education (69386) activation and proprioception, including postural re-education.    Gait Training (65638) for normalization of ambulation patterns and AD training.   Manual Therapy (01982) as indicated to include: Passive Range of Motion, Gr I-IV mobilizations, Grade V Manipulation, Soft Tissue Mobilization, Trigger Point Release, and Myofascial Release  Modalities as needed that may include: Cryotherapy, Electrical Stimulation, Biofeedback, Thermal Agents, and Ultrasound  Patient education on joint protection, postural re-education, activity modification, progression of HEP, and vestibular fuction/BPPV  CRP for assessment, treatment and education of BPPV    Plan:  habituation, manual  and functional training     Electronically Signed by Raleigh Wang PT, DPT  Date: 01/21/2025     Note: Portions of this note have been templated and/or copied from initial evaluation, reassessments and prior notes for documentation efficiency.    Note: If patient does not return for scheduled/recommended follow up visits, this note will serve as a discharge from care along with the

## 2025-01-23 ENCOUNTER — HOSPITAL ENCOUNTER (OUTPATIENT)
Dept: PHYSICAL THERAPY | Age: 25
Setting detail: THERAPIES SERIES
Discharge: HOME | End: 2025-01-23
Payer: COMMERCIAL

## 2025-01-23 PROCEDURE — 97112 NEUROMUSCULAR REEDUCATION: CPT

## 2025-01-23 NOTE — FLOWSHEET NOTE
[facility] - Outpatient Rehabilitation and Therapy [address], [place] [address] office: [phone] fax: [phone]  Recommendation:    [x] Continue PT 2x / wk for 3 weeks.   [] Hold PT, pending MD visit   [] Discharge to [facility]. Follow up with PT or MD PRN.        Physical Therapy: TREATMENT/PROGRESS NOTE   Patient: Melisa Lopez (24 y.o. female)   Examination Date: 2025   :  2000 MRN: [id number]   Visit #: 19   3/6  Insurance Allowable Auth Needed   [place] []Yes    [x]No    Insurance: Pay[place]: [place] BC[place] / Plan[place] / Product Type: *No Product type* /   Insurance ID: [id number] - ([facility])  Secondary Insurance (if applicable):    Treatment Diagnosis:     ICD-10-CM    1. Imbalance  R26.89       2. Dizziness  R42       3. Vestibular hypofunction of both ears  H83.2X3          Medical Diagnosis:  Vertigo [R42]   Referring Physician: Bonnie Arciniega APRN *  PCP: Bonnie Arciniega APRN - CNP       Plan of care signed (Y/N):     Date of Patient follow up with Physician: TBT      Plan of Care Report: YES, Date Range for this report: 12/3/2024 to 2025  POC update due: (10 visits /OR AUTH LIMITS, whichever is less) 25 or prior                                            Medical History:  Comorbidities:  Other: FRAIRE, Tachycardia, ADHD, SOB  Relevant Medical History:                                          Precautions/ Contra-indications:  Possibly symptoms of FRAIRE          Latex allergy:  NO  Pacemaker:    NO  Contraindications for Manipulation: None  Date of Surgery: NA  Other:    Red Flags:  None  Syncope    Suicide Screening:   The patient did not verbalize a primary behavioral concern, suicidal ideation, suicidal intent, or demonstrate suicidal behaviors.    Preferred Language for Healthcare:   [x] English       [] other:    SUBJECTIVE EXAMINATION     Patient stated complaint/comment: : Patient reports that her baseline dizziness is 2/10.     :

## 2025-01-27 ENCOUNTER — HOSPITAL ENCOUNTER (OUTPATIENT)
Dept: PHYSICAL THERAPY | Age: 25
Setting detail: THERAPIES SERIES
Discharge: HOME | End: 2025-01-27
Payer: COMMERCIAL

## 2025-01-27 PROCEDURE — 97110 THERAPEUTIC EXERCISES: CPT

## 2025-01-27 PROCEDURE — 97112 NEUROMUSCULAR REEDUCATION: CPT

## 2025-01-27 NOTE — FLOWSHEET NOTE
[facility] - Outpatient Rehabilitation and Therapy [address], [place] [address] office: [phone] fax: [phone]  Recommendation:    [x] Continue PT 2x / wk for 3 weeks.   [] Hold PT, pending MD visit   [] Discharge to [facility]. Follow up with PT or MD PRN.        Physical Therapy: TREATMENT/PROGRESS NOTE   Patient: Melisa Lopez (25 y.o. female)   Examination Date: 2025   :  2000 MRN: [id number]   Visit #: 20   3/8 udated   Insurance Allowable Auth Needed   Approve  8 visits 25-3/21/25  Athem  []Yes    [x]No    Insurance: Pay[place]: [place] BC[place] / Plan[place] / Product Type: *No Product type* /   Insurance ID: [id number] - ([place] [employer])  Secondary Insurance (if applicable):    Treatment Diagnosis:     ICD-10-CM    1. Imbalance  R26.89       2. Dizziness  R42       3. Vestibular hypofunction of both ears  H83.2X3          Medical Diagnosis:  Vertigo [R42]   Referring Physician: Bonnie Arciniega APRN *  PCP: Bonnie Arciniega APRN - CNP       Plan of care signed (Y/N):     Date of Patient follow up with Physician: TBT      Plan of Care Report: YES, Date Range for this report: 12/3/2024 to 2025  POC update due: (10 visits /OR AUTH LIMITS, whichever is less) 25 or prior                                            Medical History:  Comorbidities:  Other: FRAIRE, Tachycardia, ADHD, SOB  Relevant Medical History:                                          Precautions/ Contra-indications:  Possibly symptoms of FRAIRE          Latex allergy:  NO  Pacemaker:    NO  Contraindications for Manipulation: None  Date of Surgery: NA  Other:    Red Flags:  None  Syncope    Suicide Screening:   The patient did not verbalize a primary behavioral concern, suicidal ideation, suicidal intent, or demonstrate suicidal behaviors.    Preferred Language for Healthcare:   [x] English       [] other:    SUBJECTIVE EXAMINATION     Patient stated complaint/comment: : Patient reports

## 2025-01-29 ENCOUNTER — HOSPITAL ENCOUNTER (OUTPATIENT)
Dept: PHYSICAL THERAPY | Age: 25
Setting detail: THERAPIES SERIES
Discharge: HOME | End: 2025-01-29
Payer: COMMERCIAL

## 2025-01-29 PROCEDURE — 97530 THERAPEUTIC ACTIVITIES: CPT

## 2025-01-29 PROCEDURE — 97110 THERAPEUTIC EXERCISES: CPT

## 2025-02-03 ENCOUNTER — HOSPITAL ENCOUNTER (OUTPATIENT)
Dept: PHYSICAL THERAPY | Age: 25
Setting detail: THERAPIES SERIES
Discharge: HOME | End: 2025-02-03
Payer: COMMERCIAL

## 2025-02-03 PROCEDURE — 97112 NEUROMUSCULAR REEDUCATION: CPT

## 2025-02-03 PROCEDURE — 97110 THERAPEUTIC EXERCISES: CPT

## 2025-02-03 NOTE — FLOWSHEET NOTE
(20825)     Iontophoresis (61686)    VASO (48711)     Ultrasound (22074)    Group Therapy (07893)     Estim Attended (14701)    Canalith Repositioning (04470)     Physical Performance Test (75627)         Other:    Other:    Total Timed Code Tx Minutes 40 3       Total Treatment Minutes 40        Charge Justification:  (97737) THERAPEUTIC EXERCISE - Provided verbal/tactile cueing for activities related to strengthening, flexibility, endurance, ROM performed to prevent loss of range of motion, maintain or improve muscular strength or increase flexibility, following either an injury or surgery.   (60853) MANUAL THERAPY -  Manual therapy techniques, 1 or more regions, each 15 minutes (Mobilization/manipulation, manual lymphatic drainage, manual traction) for the purpose of modulating pain, promoting relaxation,  increasing ROM, reducing/eliminating soft tissue swelling/inflammation/restriction, improving soft tissue extensibility and allowing for proper ROM for normal function with self care, mobility, lifting and ambulation      GOALS     Patient stated goal: To improve balance and dizziness  Status:  [] Progressing: [] Met: [] Not Met: [] Adjusted    Therapist goals for Patient:   Short Term Goals: To be achieved in: 2 weeks  Independent in HEP and progression per patient tolerance, in order to progress toward full function.    Status: [x] Progressing: [] Met: [] Not Met: [] Adjusted  Patient's subjective complaint of dizziness/imbalance/symptoms to decrease by 50% to tolerate functional activities.   Status: [x] Progressing: [] Met: [] Not Met: [] Adjusted    Long Term Goals: To be achieved in: 5 weeks / DC   DHI score of 30 or less to assist with return to prior level of function.    Status: [x] Progressing: [] Met: [] Not Met: [] Adjusted  Patient will return to turning around and looking up without increased symptoms or restriction to work towards return to prior level of function.        Status: [x] Progressing:

## 2025-02-05 ENCOUNTER — OFFICE VISIT (OUTPATIENT)
Dept: INTERNAL MEDICINE CLINIC | Age: 25
End: 2025-02-05

## 2025-02-05 VITALS
BODY MASS INDEX: 27.6 KG/M2 | DIASTOLIC BLOOD PRESSURE: 72 MMHG | OXYGEN SATURATION: 100 % | WEIGHT: 150 LBS | SYSTOLIC BLOOD PRESSURE: 120 MMHG | HEART RATE: 89 BPM | HEIGHT: 62 IN

## 2025-02-05 DIAGNOSIS — Z00.00 ENCOUNTER FOR WELL ADULT EXAM WITHOUT ABNORMAL FINDINGS: Primary | ICD-10-CM

## 2025-02-05 DIAGNOSIS — J45.20 MILD INTERMITTENT ASTHMA WITHOUT COMPLICATION: ICD-10-CM

## 2025-02-05 DIAGNOSIS — K21.9 GASTROESOPHAGEAL REFLUX DISEASE, UNSPECIFIED WHETHER ESOPHAGITIS PRESENT: ICD-10-CM

## 2025-02-05 DIAGNOSIS — R07.9 CHEST PAIN, UNSPECIFIED TYPE: ICD-10-CM

## 2025-02-05 DIAGNOSIS — G47.10 HYPERSOMNIA: ICD-10-CM

## 2025-02-05 DIAGNOSIS — R73.03 PREDIABETES: ICD-10-CM

## 2025-02-05 DIAGNOSIS — R00.2 PALPITATIONS: ICD-10-CM

## 2025-02-05 DIAGNOSIS — R53.83 FATIGUE, UNSPECIFIED TYPE: ICD-10-CM

## 2025-02-05 DIAGNOSIS — D50.8 IRON DEFICIENCY ANEMIA SECONDARY TO INADEQUATE DIETARY IRON INTAKE: ICD-10-CM

## 2025-02-05 DIAGNOSIS — F41.1 GENERALIZED ANXIETY DISORDER: ICD-10-CM

## 2025-02-05 DIAGNOSIS — R41.89 BRAIN FOG: ICD-10-CM

## 2025-02-05 LAB
ALBUMIN SERPL-MCNC: 4.5 G/DL (ref 3.4–5)
ALBUMIN/GLOB SERPL: 1.6 {RATIO} (ref 1.1–2.2)
ALP SERPL-CCNC: 67 U/L (ref 40–129)
ALT SERPL-CCNC: 10 U/L (ref 10–40)
AMPHETAMINES UR QL SCN>1000 NG/ML: NORMAL
ANION GAP SERPL CALCULATED.3IONS-SCNC: 8 MMOL/L (ref 3–16)
AST SERPL-CCNC: 18 U/L (ref 15–37)
BARBITURATES UR QL SCN>200 NG/ML: NORMAL
BENZODIAZ UR QL SCN>200 NG/ML: NORMAL
BILIRUB SERPL-MCNC: 0.3 MG/DL (ref 0–1)
BILIRUB UR QL STRIP.AUTO: NEGATIVE
BUN SERPL-MCNC: 9 MG/DL (ref 7–20)
CALCIUM SERPL-MCNC: 9.5 MG/DL (ref 8.3–10.6)
CANNABINOIDS UR QL SCN>50 NG/ML: NORMAL
CHLORIDE SERPL-SCNC: 104 MMOL/L (ref 99–110)
CLARITY UR: CLEAR
CO2 SERPL-SCNC: 27 MMOL/L (ref 21–32)
COCAINE UR QL SCN: NORMAL
COLOR UR: YELLOW
CREAT SERPL-MCNC: 0.7 MG/DL (ref 0.6–1.1)
DEPRECATED RDW RBC AUTO: 14.2 % (ref 12.4–15.4)
DRUG SCREEN COMMENT UR-IMP: NORMAL
EST. AVERAGE GLUCOSE BLD GHB EST-MCNC: 119.8 MG/DL
FENTANYL SCREEN, URINE: NORMAL
GFR SERPLBLD CREATININE-BSD FMLA CKD-EPI: >90 ML/MIN/{1.73_M2}
GLUCOSE SERPL-MCNC: 92 MG/DL (ref 70–99)
GLUCOSE UR STRIP.AUTO-MCNC: NEGATIVE MG/DL
HBA1C MFR BLD: 5.8 %
HCT VFR BLD AUTO: 38 % (ref 36–48)
HGB BLD-MCNC: 12.3 G/DL (ref 12–16)
HGB UR QL STRIP.AUTO: NEGATIVE
KETONES UR STRIP.AUTO-MCNC: NEGATIVE MG/DL
LEUKOCYTE ESTERASE UR QL STRIP.AUTO: NEGATIVE
LIPASE SERPL-CCNC: 34 U/L (ref 13–60)
MCH RBC QN AUTO: 27.7 PG (ref 26–34)
MCHC RBC AUTO-ENTMCNC: 32.3 G/DL (ref 31–36)
MCV RBC AUTO: 85.8 FL (ref 80–100)
METHADONE UR QL SCN>300 NG/ML: NORMAL
NITRITE UR QL STRIP.AUTO: NEGATIVE
OPIATES UR QL SCN>300 NG/ML: NORMAL
OXYCODONE UR QL SCN: NORMAL
PCP UR QL SCN>25 NG/ML: NORMAL
PH UR STRIP.AUTO: 6 [PH] (ref 5–8)
PH UR STRIP: 6 [PH]
PLATELET # BLD AUTO: 314 K/UL (ref 135–450)
PMV BLD AUTO: 8.7 FL (ref 5–10.5)
POTASSIUM SERPL-SCNC: 4.8 MMOL/L (ref 3.5–5.1)
PROT SERPL-MCNC: 7.4 G/DL (ref 6.4–8.2)
PROT UR STRIP.AUTO-MCNC: NEGATIVE MG/DL
RBC # BLD AUTO: 4.43 M/UL (ref 4–5.2)
SODIUM SERPL-SCNC: 139 MMOL/L (ref 136–145)
SP GR UR STRIP.AUTO: 1.02 (ref 1–1.03)
TSH SERPL DL<=0.005 MIU/L-ACNC: 0.95 UIU/ML (ref 0.27–4.2)
UA COMPLETE W REFLEX CULTURE PNL UR: NORMAL
UA DIPSTICK W REFLEX MICRO PNL UR: NORMAL
URN SPEC COLLECT METH UR: NORMAL
UROBILINOGEN UR STRIP-ACNC: 0.2 E.U./DL
WBC # BLD AUTO: 5.4 K/UL (ref 4–11)

## 2025-02-05 RX ORDER — PANTOPRAZOLE SODIUM 40 MG/1
40 TABLET, DELAYED RELEASE ORAL
Qty: 14 TABLET | Refills: 0 | Status: SHIPPED | OUTPATIENT
Start: 2025-02-05

## 2025-02-05 ASSESSMENT — ENCOUNTER SYMPTOMS
NAUSEA: 1
CONSTIPATION: 0
SHORTNESS OF BREATH: 1
WHEEZING: 0
COUGH: 0
CHEST TIGHTNESS: 0
VOMITING: 0
ABDOMINAL PAIN: 0

## 2025-02-05 NOTE — ASSESSMENT & PLAN NOTE
Chronic, intermittent. Uncontrolled. Reviewed bland diet, small frequent meals. PPI daily for 2 weeks. Checking BW and gallbladder US. Okay to use prn antiacids.      Orders:    pantoprazole (PROTONIX) 40 MG tablet; Take 1 tablet by mouth every morning (before breakfast)    US GALLBLADDER RUQ; Future

## 2025-02-05 NOTE — ASSESSMENT & PLAN NOTE
Chronic, not currently on supplement. Repeat CBC today. Encouraged pt to restart supplement and/or increase dietary iron.

## 2025-02-05 NOTE — PROGRESS NOTES
2/5/25     Chief Complaint   Patient presents with    Annual Exam     \"Major chest pain on Saturday\"   Chest pain started at 7 am and ended around 11 am- \"felt a pop in the middle of chest\"   Fasting for labs        History of Present Illness  The patient presents for an annual exam and evaluation of an episode of chest pain.    - Experienced an episode of chest pain on Saturday morning, beginning around 7 AM and persisting until approximately 11:30 AM  - Pain was accompanied by rapid breathing and mild shortness of breath, but not perceived as an asthma attack  - Pain was severe enough to disrupt sleep and limit mobility, particularly due to a sensation of tightness in the middle of the chest, more pronounced on the right side  - Describes the pain as a string getting tighter  - Rates the pain intensity as 6 to 7.5 on a scale of 10  - Does not recall any late-night eating prior to the onset of the pain  - Occasionally experiences similar symptoms after eating, depending on the type of food consumed  - Reports intermittent heartburn after meals, more frequent and intense the past week     - Reports no associated injury or increased gas production  - Certain movements, such as stretching, helps to alleviate the tightness, while slouching slightly exacerbates it  - Managed to perform her household chores, despite the discomfort  - Attempted to alleviate the pain with ibuprofen and a heating pad  - Since Saturday, she has experienced minimal pain, with occasional tightness that resolves with stretching    - Gradually reducing venlafaxine dosage to 37.5 mg under the guidance of her sleep doctor, who plans to discontinue the medication for 2 weeks before resuming a 150 mg dose due to its potential impact on REM sleep  - Recalls a similar episode within the past year or two, which prompted a visit to the ER where all tests, including an EKG, were normal    Nausea  - Reports intermittent nausea, for which she takes

## 2025-02-06 ENCOUNTER — HOSPITAL ENCOUNTER (OUTPATIENT)
Dept: PHYSICAL THERAPY | Age: 25
Setting detail: THERAPIES SERIES
Discharge: HOME | End: 2025-02-06
Payer: COMMERCIAL

## 2025-02-06 PROCEDURE — 97110 THERAPEUTIC EXERCISES: CPT

## 2025-02-06 PROCEDURE — 97140 MANUAL THERAPY 1/> REGIONS: CPT

## 2025-02-06 NOTE — FLOWSHEET NOTE
[facility] - Outpatient Rehabilitation and Therapy [address], [place] [address] office: [phone] fax: [phone]  Recommendation:    [x] Continue PT 2x / wk for 3 weeks.   [] Hold PT, pending MD visit   [] Discharge to [facility]. Follow up with PT or MD PRN.        Physical Therapy: TREATMENT/PROGRESS NOTE   Patient: Melisa Lopez (25 y.o. female)   Examination Date: 2025   :  2000 MRN: [id number]   Visit #:  udated   Insurance Allowable Auth Needed   Approve  8 visits 25-3/21/25  Athem  []Yes    [x]No    Insurance: Pay[place] / Plan[place] / Product Type: *No Product type* /   Insurance ID: [id number] - ([place] [employer])  Secondary Insurance (if applicable):    Treatment Diagnosis:     ICD-10-CM    1. Imbalance  R26.89       2. Dizziness  R42       3. Vestibular hypofunction of both ears  H83.2X3          Medical Diagnosis:  Vertigo [R42]   Referring Physician: Bonnie Arciniega APRN *  PCP: Bonnie Arciniega APRN - CNP       Plan of care signed (Y/N):     Date of Patient follow up with Physician: TBT      Plan of Care Report: YES, Date Range for this report: 12/3/2024 to 2025  POC update due: (10 visits /OR AUTH LIMITS, whichever is less) 25 or prior                                            Medical History:  Comorbidities:  Other: FRAIRE, Tachycardia, ADHD, SOB  Relevant Medical History:                                          Precautions/ Contra-indications:  Possibly symptoms of FRAIRE          Latex allergy:  NO  Pacemaker:    NO  Contraindications for Manipulation: None  Date of Surgery: NA  Other:    Red Flags:  None  Syncope    Suicide Screening:   The patient did not verbalize a primary behavioral concern, suicidal ideation, suicidal intent, or demonstrate suicidal behaviors.    Preferred Language for Healthcare:   [x] English       [] other:    SUBJECTIVE EXAMINATION     Patient stated complaint/comment: :  Patient reports

## 2025-02-11 ENCOUNTER — HOSPITAL ENCOUNTER (OUTPATIENT)
Dept: PHYSICAL THERAPY | Age: 25
Setting detail: THERAPIES SERIES
Discharge: HOME | End: 2025-02-11
Payer: COMMERCIAL

## 2025-02-11 PROCEDURE — 97112 NEUROMUSCULAR REEDUCATION: CPT

## 2025-02-11 PROCEDURE — 97110 THERAPEUTIC EXERCISES: CPT

## 2025-02-11 NOTE — FLOWSHEET NOTE
Not Met: [] Adjusted  Patient will return to turning around and looking up without increased symptoms or restriction to work towards return to prior level of function.        Status: [x] Progressing: [] Met: [] Not Met: [] Adjusted  Patient will perform normal ADLs without symptoms 75% of the time.            Status: [x] Progressing: [] Met: [] Not Met: [] Adjusted  Patient will resume normal work/leisure activities without symptoms 75% of the time.            Status: [x] Progressing: [] Met: [] Not Met: [] Adjusted  Patient will improve Manjit sensory organization/Modified CTSIB score to 5/7 in order to reduce fall risk.           Status: [x] Progressing: [] Met: [] Not Met: [] Adjusted    Overall Progression Towards Functional goals/ Treatment Progress Update:  [] Patient is progressing as expected towards functional goals listed.    [] Progression is slowed due to complexities/Impairments listed.  [] Progression has been slowed due to co-morbidities.  [x] Plan just implemented, too soon (<30days) to assess goals progression   [] Goals require adjustment due to lack of progress  [] Patient is not progressing as expected and requires additional follow up with physician  [] Other:     TREATMENT PLAN     Frequency/Duration: 2x/week for 5 weeks for the following treatment interventions:    Interventions:  Therapeutic Exercise (70815) including: strength training, ROM, and functional mobility  Therapeutic Activities (39352) including: functional mobility training and education.  Neuromuscular Re-education (08494) activation and proprioception, including postural re-education.    Gait Training (87034) for normalization of ambulation patterns and AD training.   Manual Therapy (62954) as indicated to include: Passive Range of Motion, Gr I-IV mobilizations, Grade V Manipulation, Soft Tissue Mobilization, Trigger Point Release, and Myofascial Release  Modalities as needed that may include: Cryotherapy, Electrical Stimulation,

## 2025-02-13 ENCOUNTER — HOSPITAL ENCOUNTER (OUTPATIENT)
Dept: ULTRASOUND IMAGING | Age: 25
Discharge: HOME | End: 2025-02-13
Payer: COMMERCIAL

## 2025-02-13 DIAGNOSIS — K21.9 GASTROESOPHAGEAL REFLUX DISEASE, UNSPECIFIED WHETHER ESOPHAGITIS PRESENT: ICD-10-CM

## 2025-02-13 DIAGNOSIS — R07.9 CHEST PAIN, UNSPECIFIED TYPE: ICD-10-CM

## 2025-02-13 PROCEDURE — 76705 ECHO EXAM OF ABDOMEN: CPT

## 2025-02-18 ENCOUNTER — HOSPITAL ENCOUNTER (OUTPATIENT)
Dept: PHYSICAL THERAPY | Age: 25
Setting detail: THERAPIES SERIES
Discharge: HOME | End: 2025-02-18
Payer: COMMERCIAL

## 2025-02-18 PROCEDURE — 97530 THERAPEUTIC ACTIVITIES: CPT

## 2025-02-18 PROCEDURE — 97110 THERAPEUTIC EXERCISES: CPT

## 2025-02-18 NOTE — DISCHARGE SUMMARY
[facility] - Outpatient Rehabilitation and Therapy [address], [place] [address] office: [phone] fax: [phone]   Physical Therapy Discharge Summary    Dear DELIO Dye -*   ,    We had the pleasure of treating the following patient for physical therapy services at [facility].  A summary of our findings can be found in the discharge summary below.  If you have any questions or concerns regarding these findings, please do not hesitate to contact me at the office phone number checked above.  Thank you for the referral.         Total Visits: 25     Recommendation:    [] Continue PT x / wk for  weeks.   [] Hold PT, pending MD visit   [x] Discharge to HEP. Follow up with PT or MD PRN.     Reason for Discharge:   The patient has made good progress toward functional rehab goals . She met 2/2 STGs and  2/5 LTGs. Pt saw that she was able to stay close baseline dizziness with physical activity and recover within less 2 min. Pt encouraged to perform updated HEP to build up stamina/energy.  She is appropriate for discharge at this to follow up with PCP.               Physical Therapy: TREATMENT/PROGRESS NOTE   Patient: Melisa Lopez (25 y.o. female)   Examination Date: 2025   :  2000 MRN: [id number]   Visit #: 25    updated   Insurance Allowable Auth Needed   Approve  8 visits 25-3/21/25  Athem  []Yes    [x]No    Insurance: Payor: [employer] / Plan: [employer] / Product Type: *No Product type* /   Insurance ID: [id number] - (Commercial)  Secondary Insurance (if applicable): [place] BCBS   Treatment Diagnosis:     ICD-10-CM    1. Imbalance  R26.89       2. Dizziness  R42       3. Vestibular hypofunction of both ears  H83.2X3          Medical Diagnosis:  Vertigo [R42]   Referring Physician: Bonnie Arciniega APRN *  PCP: Bonnie Arciniega APRN - CNP       Plan of care signed (Y/N):     Date of Patient follow up with Physician: TBT

## 2025-02-25 ENCOUNTER — HOSPITAL ENCOUNTER (OUTPATIENT)
Dept: SLEEP CENTER | Age: 25
Discharge: HOME | End: 2025-02-25
Payer: COMMERCIAL

## 2025-02-25 DIAGNOSIS — R41.89 BRAIN FOG: ICD-10-CM

## 2025-02-25 DIAGNOSIS — R53.83 FATIGUE, UNSPECIFIED TYPE: ICD-10-CM

## 2025-02-25 DIAGNOSIS — G47.10 HYPERSOMNIA: ICD-10-CM

## 2025-02-25 PROCEDURE — 95810 POLYSOM 6/> YRS 4/> PARAM: CPT

## 2025-02-26 ENCOUNTER — HOSPITAL ENCOUNTER (OUTPATIENT)
Dept: SLEEP CENTER | Age: 25
Discharge: HOME | End: 2025-02-26
Payer: COMMERCIAL

## 2025-02-26 PROCEDURE — 95805 MULTIPLE SLEEP LATENCY TEST: CPT

## 2025-02-27 ENCOUNTER — TELEPHONE (OUTPATIENT)
Dept: PULMONOLOGY | Age: 25
End: 2025-02-27

## 2025-02-27 PROBLEM — G47.10 HYPERSOMNIA: Status: ACTIVE | Noted: 2025-02-27

## 2025-02-27 NOTE — TELEPHONE ENCOUNTER
Patient completed PSG/MSLT with negative results for both.  I will contact her to discuss results.    Sathish Nicole MD

## 2025-02-27 NOTE — TELEPHONE ENCOUNTER
I called patient to discuss about sleep test results.  Patient understands that PSG/MSLT were both unremarkable.  However she reports that the MSLT results might be skewed by the fact that there was a lot of noise at the hospital when she was trying to take naps.  She continues to report excessive daytime sleepiness.  We discussed about completing a 2-week sleep diary to see if it shows an average daily sleep of about 10-11 hours.  If that is the case, we might proceed to treat her condition as idiopathic hypersomnia.    Sathish Nicole MD

## 2025-03-03 DIAGNOSIS — R00.2 PALPITATIONS: ICD-10-CM

## 2025-03-03 DIAGNOSIS — R00.0 TACHYCARDIA: ICD-10-CM

## 2025-03-03 RX ORDER — PROPRANOLOL HYDROCHLORIDE 60 MG/1
60 CAPSULE, EXTENDED RELEASE ORAL DAILY
Qty: 90 CAPSULE | Refills: 1 | Status: SHIPPED | OUTPATIENT
Start: 2025-03-03

## 2025-03-03 NOTE — TELEPHONE ENCOUNTER
Last OV: 2/5/2025  Next OV: Visit date not found    Next appointment due:6/5/2025     Last fill:8/5/24  Refills:1

## 2025-03-05 PROBLEM — R53.83 FATIGUE: Status: ACTIVE | Noted: 2025-01-08

## 2025-03-25 DIAGNOSIS — F43.23 ADJUSTMENT DISORDER WITH MIXED ANXIETY AND DEPRESSED MOOD: ICD-10-CM

## 2025-03-25 RX ORDER — VENLAFAXINE HYDROCHLORIDE 150 MG/1
CAPSULE, EXTENDED RELEASE ORAL DAILY
Qty: 30 CAPSULE | Refills: 0 | OUTPATIENT
Start: 2025-03-25

## 2025-03-25 NOTE — TELEPHONE ENCOUNTER
Last OV: 2/5/2025  Next OV: Visit date not found    Next appointment due:6/5/2025     Last fill:1/8/25  Refills:0

## 2025-04-24 ENCOUNTER — OFFICE VISIT (OUTPATIENT)
Dept: INTERNAL MEDICINE CLINIC | Age: 25
End: 2025-04-24
Payer: COMMERCIAL

## 2025-04-24 VITALS
OXYGEN SATURATION: 100 % | BODY MASS INDEX: 27.75 KG/M2 | HEIGHT: 62 IN | DIASTOLIC BLOOD PRESSURE: 76 MMHG | WEIGHT: 150.8 LBS | HEART RATE: 76 BPM | SYSTOLIC BLOOD PRESSURE: 112 MMHG

## 2025-04-24 DIAGNOSIS — R53.82 CHRONIC FATIGUE: ICD-10-CM

## 2025-04-24 DIAGNOSIS — M79.89 HAND SWELLING: ICD-10-CM

## 2025-04-24 DIAGNOSIS — R00.0 TACHYCARDIA: ICD-10-CM

## 2025-04-24 DIAGNOSIS — R42 VERTIGO: ICD-10-CM

## 2025-04-24 DIAGNOSIS — R20.2 TINGLING: ICD-10-CM

## 2025-04-24 DIAGNOSIS — R41.89 BRAIN FOG: ICD-10-CM

## 2025-04-24 DIAGNOSIS — R00.2 PALPITATIONS: Primary | ICD-10-CM

## 2025-04-24 PROCEDURE — 99214 OFFICE O/P EST MOD 30 MIN: CPT | Performed by: NURSE PRACTITIONER

## 2025-04-24 PROCEDURE — 1036F TOBACCO NON-USER: CPT | Performed by: NURSE PRACTITIONER

## 2025-04-24 PROCEDURE — G8427 DOCREV CUR MEDS BY ELIG CLIN: HCPCS | Performed by: NURSE PRACTITIONER

## 2025-04-24 PROCEDURE — G8419 CALC BMI OUT NRM PARAM NOF/U: HCPCS | Performed by: NURSE PRACTITIONER

## 2025-04-24 PROCEDURE — G2211 COMPLEX E/M VISIT ADD ON: HCPCS | Performed by: NURSE PRACTITIONER

## 2025-04-24 RX ORDER — VENLAFAXINE HYDROCHLORIDE 150 MG/1
CAPSULE, EXTENDED RELEASE ORAL DAILY
COMMUNITY
Start: 2025-02-26

## 2025-04-24 NOTE — PROGRESS NOTES
4/24/25     Chief Complaint   Patient presents with    Swelling     Hands- see MyChart message        History of Present Illness  The patient presents for evaluation of fatigue, hand edema, tachycardia, and paresthesia.    Fatigue  - The patient's fatigue has shown improvement with increased daytime napping.  - A polysomnography ruled out obstructive sleep apnea and narcolepsy, suggesting a possible idiopathic hypersomnia.  - She is currently maintaining a sleep log for two weeks for further evaluation, she then plans on following up with a sleep specialist    Hand Edema x2   - She reports intermittent hand edema, with an episode occurring after standing for 30 minutes.  - Another instance was isolated to the middle finger of the left hand.  - There is no correlation with the consumption of salty snacks, and denies any known triggers    Tachycardia  - The patient's tachycardia is intermittently uncontrolled, with heart rates reaching 120-130 bpm during simple activities.  - Her resting heart rate is in the 70s, increasing to 110-120 bpm upon standing and walking.  - She experiences occasional dizziness, with a baseline severity of 1-1.5, increasing to 3 when meclizine is ineffective.  - She wears compression socks     Paresthesia  - She reports tingling in bilateral upper and lower extremities, with minimal relief upon changing positions.  - She experiences tingling in her legs and feet during work, which is less noticeable during movement but present when standing still.  - There is occasional tingling in her cheeks accompanied by redness, although this has not occurred in the past few weeks.  - Symptoms have worsened with stress and have been present for at least a month, progressively worsening.  - She notes occasional color changes in her hands during episodes.    Supplemental information: None    FAMILY HISTORY  - No known family history of autoimmune disease    Allergies   Allergen Reactions    Pecan Pollen

## 2025-04-24 NOTE — ASSESSMENT & PLAN NOTE
Chronic and intermittent often relieved with meclizine  Continue wearing compression and working on therapy exercises as this has been beneficial      Orders:    [facility]    MRI BRAIN W WO CONTRAST; Future

## 2025-04-24 NOTE — ASSESSMENT & PLAN NOTE
- Intermittent tingling in extremities, more pronounced when lying down or standing still  - Present for at least a month, occasional tingling in cheeks with redness  - Checking MRI given other symptoms  -Referral placed for neurology    Orders:    [facility]    MRI BRAIN W WO CONTRAST; Future

## 2025-04-24 NOTE — ASSESSMENT & PLAN NOTE
Chronic and intermittent  Continue taking beta-blocker as this has been beneficial  Referral placed for cardiology / EP   - Heart rate 120-130 bpm during simple activities  - Occasional vertigo, not consistently with palpitations   - reviewed previous holter monitor      Orders:    [facility]    MRI BRAIN W WO CONTRAST; Future

## 2025-04-24 NOTE — ASSESSMENT & PLAN NOTE
Chronic and intermittent  Worse with increased fatigue    Orders:    [facility] [place] [facility]    MRI BRAIN W WO CONTRAST; Future

## 2025-04-24 NOTE — ASSESSMENT & PLAN NOTE
Chronic, intermittent  - Improved with daytime naps  - Sleep study ruled out sleep apnea and narcolepsy, suggesting idiopathic hypersomnia  - Maintaining a two-week sleep log for further evaluation  - Follow-up with sleep specialist as recommended    Orders:    [facility]    MRI BRAIN W WO CONTRAST; Future

## 2025-04-24 NOTE — ASSESSMENT & PLAN NOTE
Chronic and intermittent  Continue taking beta-blocker as this has been beneficial  Referral placed for cardiology / EP   - Heart rate 120-130 bpm during simple activities  - Occasional vertigo, not consistently with palpitations   - reviewed previous holter monitor      Orders:    [facility]    MRI BRAIN W WO CONTRAST; Future

## 2025-04-26 NOTE — PROGRESS NOTES
Jessica Reynoso (:  2000) is a 21 y.o. female,Established patient, here for evaluation of the following chief complaint(s):  Dizziness, Blurred Vision, and Nausea         ASSESSMENT/PLAN:  1. Labyrinthitis of left ear  Patient's findings are very consistent with labyrinthitis she does not have any postural changes with her blood pressure checked sitting and standing up it actually did go up slightly which is normal her heart rate did not change at all staying at around 102    The patient symptomatology with a viral prodrome in the last couple days and the mild nature of it and the reproduction of the symptoms with Epley's maneuver is very consistent with a labyrinthitis especially in the presence of a normal neurological exam so at this point again encouraged the patient to increase her fluid intake take Tylenol if she has any aches or pains otherwise I think this is self-limiting and is already starting to improve and is  No follow-ups on file. Subjective   SUBJECTIVE/OBJECTIVE:    Lab Review   Lab Results   Component Value Date/Time     10/07/2022 10:26 AM    K 4.0 10/07/2022 10:26 AM    CO2 26 10/07/2022 10:26 AM    BUN 5 10/07/2022 10:26 AM    CREATININE 0.6 10/07/2022 10:26 AM    GLUCOSE 90 10/07/2022 10:26 AM    CALCIUM 9.7 10/07/2022 10:26 AM     Lab Results   Component Value Date/Time    WBC 4.8 10/07/2022 10:26 AM    HGB 14.3 10/07/2022 10:26 AM    HCT 43.4 10/07/2022 10:26 AM    MCV 90.1 10/07/2022 10:26 AM     10/07/2022 10:26 AM     No results found for: CHOL, TRIG, HDL, LDLDIRECT    Vitals 3/28/2023 2023 3/11/4751   SYSTOLIC 415 358 248   DIASTOLIC 76 80 78   Pulse 97 119 99   Temp - - -   Resp - - -   SpO2 98 99 99   Weight 150 lb 6.4 oz 147 lb 3.2 oz 146 lb   Height 5' 2\" 5' 2\" -   Body mass index 27.51 kg/m2 26.92 kg/m2 -   Pain Level - - -       Dizziness  This is a new problem. Associated symptoms include congestion, fatigue, vertigo and a visual change.  Pertinent
show

## 2025-04-28 ENCOUNTER — TELEPHONE (OUTPATIENT)
Dept: CARDIOLOGY CLINIC | Age: 25
End: 2025-04-28

## 2025-04-28 NOTE — TELEPHONE ENCOUNTER
DAILY PEDIATRIC RESIDENT PROGRESS NOTE    ADMISSION DATE:  11/4/2022  DATE:  11/6/2022  CURRENT HOSPITAL DAY:  Hospital Day: 3       ACTIVE PROBLEMS:    Active Hospital Problems    Diagnosis    • Acute bronchiolitis due to respiratory syncytial virus (RSV)        SUMMARY STATEMENT:    Rossy Garay is a 32 month old female patient admitted with Acute bronchiolitis due to respiratory syncytial virus (RSV) [J21.0]     SUBJECTIVE:  Obtained from Mom.    Patient did well overnight.  His breathing more comfortably this morning.  Continues on high flow nasal cannula at 1 L/kg and 21%.  Further history obtained from mom who clarifies that patient has not received any steroids prior to admission to the hospital.  No steroids were given in the ED.  Patient has been on albuterol 2.5 mg every 2 hours.    Live video/phone  service used? No     Complete ROS reviewed with pertinent positives as noted above.    OBJECTIVE:      Vitals:    Vital Last Value 24 Hour Range   Temperature 97.5 °F (36.4 °C) (11/06/22 1200) Temp  Min: 97 °F (36.1 °C)  Max: 102.2 °F (39 °C)   Pulse 131 (11/06/22 1200) Pulse  Min: 131  Max: 178   Respiratory 32 (11/06/22 1200) Resp  Min: 24  Max: 46   Non-Invasive  Blood Pressure (!) 92/64 (11/06/22 1200) BP  Min: 88/55  Max: 107/72   Pulse Oximetry 96 % (11/06/22 1200) SpO2  Min: 94 %  Max: 99 %   Arterial   Blood Pressure   No data recorded        INTAKE/OUTPUT:      Intake/Output Summary (Last 24 hours) at 11/6/2022 1251  Last data filed at 11/6/2022 1100  Gross per 24 hour   Intake 360 ml   Output 48 ml   Net 312 ml         PHYSICAL EXAM:    Physical Exam  Constitutional:       General: She is not in acute distress.     Appearance: Normal appearance. She is not toxic-appearing.   HENT:      Head: Normocephalic and atraumatic.      Right Ear: External ear normal.      Left Ear: External ear normal.      Nose: No congestion or rhinorrhea.      Mouth/Throat:      Mouth: Mucous membranes  Referring provider or self referral: Bonnie Arciniega        Reason for referral: Palpitations  Brain fog/Chronic fatigue/tachycardia/Vertigo/Tingling         Previous cardiologist:  unknown        Previous testing such as echo, monitor, EKG...etc - unknown        Please request your previous provider to fax records to [phone]       for pt to call and schedule w/1st avail provider         are moist.      Pharynx: No oropharyngeal exudate or posterior oropharyngeal erythema.      Neck: Normal range of motion.   Eyes:      Conjunctiva/sclera: Conjunctivae normal.      Pupils: Pupils are equal, round, and reactive to light.   Cardiovascular:      Rate and Rhythm: Normal rate and regular rhythm.      Pulses: Normal pulses.      Heart sounds: Normal heart sounds. No murmur heard.  Pulmonary:      Effort: Pulmonary effort is normal.      Breath sounds: Normal breath sounds.      Comments: Coarse breath sounds bilaterally with expiratory wheezing noted bilaterally in the bases.  No retractions.  No nasal flaring.  No stridor.  Abdominal:      General: Abdomen is flat. Bowel sounds are normal.      Palpations: Abdomen is soft.      Tenderness: There is no abdominal tenderness.   Musculoskeletal:         General: Normal range of motion.   Skin:     General: Skin is warm.      Capillary Refill: Capillary refill takes less than 2 seconds.      Findings: No rash.   Neurological:      General: No focal deficit present.      Mental Status: She is alert.          LABORATORY DATA:    Admission on 11/04/2022   Component Date Value Ref Range Status   • Rapid SARS-COV-2 by PCR 11/04/2022 Not Detected  Not Detected / Detected / Presumptive Positive / Inhibitors present Final   • Influenza A by PCR 11/04/2022 Not Detected  Not Detected Final   • Influenza B by PCR 11/04/2022 Not Detected  Not Detected Final   • RSV BY PCR 11/04/2022 Detected (A) Not Detected Final   • Isolation Guidelines 11/04/2022    Final    Do not use this test result as the sole decision-maker for discontinuation of isolation.   Clinical evaluation should be considered for other respiratory illness requiring transmission-based isolation.    -    No fever (<99.0 F/37.2 C) for at least 24 hours without the use of fever-reducing medications    AND  -    Respiratory symptoms have improved or resolved (e.g. cough, shortness of breath)     AND  -     COVID-19 negative test    See COVID-19 Deisolation Resource Guide   • Procedural Comment 11/04/2022    Final    SARS-COV-2 nucleic acid has not been detected indicating the absence of COVID-19.    This test was performed using the [employer] [facility] Xpert Xpress SARS-CoV-2/Flu/RSV RT-PCR test that has been given Emergency Use Authorization (EUA) by the [place] Food and Drug Administration (FDA).  These tests are considered definitive and do not need to be confirmed by another method.   • Sodium 11/04/2022 136  135 - 145 mmol/L Final   • Potassium 11/04/2022 4.7  3.4 - 5.1 mmol/L Final    Slight to moderate hemolysis, result may be falsely increased   • Chloride 11/04/2022 108  97 - 110 mmol/L Final   • Carbon Dioxide 11/04/2022 22  21 - 32 mmol/L Final   • Anion Gap 11/04/2022 11  7 - 19 mmol/L Final   • Glucose 11/04/2022 101 (A) 70 - 99 mg/dL Final   • BUN 11/04/2022 19 (A) 5 - 18 mg/dL Final   • Creatinine 11/04/2022 0.23  0.21 - 0.65 mg/dL Final   • Glomerular Filtration Rate 11/04/2022    Final    GFR not calculated for age less than 18 years   • BUN/ Creatinine Ratio 11/04/2022 83 (A) 7 - 25 Final   • Calcium 11/04/2022 9.1  8.0 - 11.0 mg/dL Final   • WBC 11/04/2022 6.6  6.0 - 17.0 K/mcL Final   • RBC 11/04/2022 4.54  3.90 - 5.30 mil/mcL Final   • HGB 11/04/2022 12.3  11.5 - 13.5 g/dL Final   • HCT 11/04/2022 36.3  34.0 - 40.0 % Final   • MCV 11/04/2022 80.0  70.0 - 86.0 fl Final   • MCH 11/04/2022 27.1  24.0 - 30.0 pg Final   • MCHC 11/04/2022 33.9  30.0 - 36.0 g/dL Final   • RDW-CV 11/04/2022 13.7  11.0 - 15.0 % Final   • RDW-SD 11/04/2022 38.4  35.0 - 47.0 fL Final   • PLT 11/04/2022 329  140 - 450 K/mcL Final   • NRBC 11/04/2022 0  <=0 /100 WBC Final   • Neutrophil, Percent 11/04/2022 77  % Final   • Lymphocytes, Percent 11/04/2022 16  % Final   • Mono, Percent 11/04/2022 5  % Final   • Eosinophils, Percent 11/04/2022 0  % Final   • Basophils, Percent 11/04/2022 1  % Final   • Reactive Lymphocytes, Percent  11/04/2022 1  0 - 5 % Final   • Absolute Neutrophil 11/04/2022 5.1  1.5 - 8.5 K/mcL Final   • Absolute Lymphocytes 11/04/2022 1.1 (A) 3.0 - 9.5 K/mcL Final   • Absolute Monocytes 11/04/2022 0.3  0.0 - 0.8 K/mcL Final   • Absolute Eosinophils 11/04/2022 0.0  0.0 - 0.7 K/mcL Final   • Absolute Basophils 11/04/2022 0.1  0.0 - 0.2 K/mcL Final   • RBC Morphology 11/04/2022 Normal  Normal Final   • WBC Morphology 11/04/2022 Normal  Normal Final   • Platelet Morphology 11/04/2022 Normal  Normal Final        IMAGING STUDIES:    XR CHEST PA OR AP 1 VIEW   Final Result   No evidence of focal acute cardiopulmonary process. Viral pneumonitis or   reactive airways disease cannot be excluded.      Electronically Signed by: ROYAL BOLDEN M.D.    Signed on: 11/6/2022 8:17 AM              MEDICATIONS:    Current Facility-Administered Medications   Medication Dose Route Frequency Provider Last Rate Last Admin   • influenza virus quadrivalent vaccine inactivated (PRESERVATIVE FREE) injection 0.5 mL  0.5 mL Intramuscular Once Lea Asencio MD       • prednisoLONE sod-phos (ORAPRED) 15 MG/5ML oral solution 11.7 mg  1 mg/kg (Dosing Weight) Oral Daily with breakfast Stefcarley Blanca, DO   11.7 mg at 11/06/22 1203   • acetaminophen (TYLENOL) 160 MG/5ML solution 182.4 mg  15 mg/kg Oral Once Lea Asencio MD       • sodium chloride (PF) 0.9 % injection 0.6-4.6 mL  0.6-4.6 mL Intravenous PRN Vijaya Trippe, DO       • sodium chloride 0.9 % flush bag 25 mL  25 mL Intravenous PRN Vijaya Trippe, DO       • sodium chloride (PF) 0.9 % injection 0.5-10 mL  0.5-10 mL Intravenous PRN Vijaya Trippe, DO       • acetaminophen (TYLENOL) 160 MG/5ML suspension 182.4 mg  15 mg/kg Oral Q6H PRN Vijaya Trippe, DO       • fluticasone propionate (FLOVENT HFA) 110 MCG/ACT inhaler 1 puff  1 puff Inhalation BID Resp Vijaya Trippe, DO   1 puff at 11/06/22 0902   • albuterol (VENTOLIN) nebulizer 2.5 mg  2.5 mg Nebulization Q2H Vijaya Trippe, DO   2.5 mg at  11/06/22 1101   • lidocaine-sodium bicarbonate 0.9-8.4% for J-tip administration 0.25 mL  0.25 mL Subcutaneous PRN Marino Duran MD   0.25 mL at 11/04/22 1455                                ASSESSMENT:  Rossy Garay is a 32 month old female with a past medical history of previous hospitalizations with pneumonia and bronchiolitis as well as reactive airway disease who was admitted to the hospital for RSV bronchiolitis with reactive airway disease component.  Patient currently stable on high flow nasal cannula at 1 L/kg 21%.  Will wean as tolerated.  Patient is currently on albuterol 2.5 mg every 2 hours.  Will add steroids to her regimen as patient has not received any steroids thus far.  Patient is hemodynamically stable     Problem List:  Principal Problem:    Acute bronchiolitis due to respiratory syncytial virus (RSV)    1.)  Respiratory distress    PLAN:  Neuro/Pain:  - Tylenol 15mg/kg q6h PRN     Resp:  -Currently on HFNC 1L/kg, wean to RA as tolerated  -Flovent 110mcg 1 puff BID  -albuterol 2.5mg q2h   -suction PRN    CV:  - HDS    FEN/GI:  - GPD    ID:  - COVID negative, RSV+  - Isolation per protocol  - CXR Viral  Antibiotic Decision Making  Patient on Antibiotics: - No      VTE: 0 (None), at baseline mobility, too small for SCDs  Lines: PIV. Function, utilization, and necessity addressed/discussed on rounds  Dispo: Patient may be discharged home once off oxygen and tolerating albuterol spaced to every 4 hours    We personally discussed the current management plan with patient and family, who stated understating of, and agreement with, current plan. All of their questions and concerns were addressed.    RN present on rounds/updated after rounds.    Stef Blanca DO   11/6/2022  12:51 PM

## 2025-05-01 NOTE — TELEPHONE ENCOUNTER
Patient was referred by isaias Conner  to the [facility]  location with Dr. Sigala.  Patient has been scheduled for 06/12 at 10:00am (am/pm).  Patient verbalized understanding of appointment instructions.  Call complete.       Referring provider or self referral: isaias Conner    Reason for referral: Palpitations Brain fog/Chronic fatigue/tachycardia/Vertigo/Tingling     Previous cardiologist: N/A    Previous testing such as echo, monitor, EKG...etc   EKG 02/05/25,monitor,06/2 /24    Please request your previous provider to fax records to [phone]

## 2025-05-02 ENCOUNTER — OFFICE VISIT (OUTPATIENT)
Age: 25
End: 2025-05-02

## 2025-05-02 VITALS
SYSTOLIC BLOOD PRESSURE: 115 MMHG | TEMPERATURE: 97.9 F | OXYGEN SATURATION: 98 % | HEART RATE: 80 BPM | DIASTOLIC BLOOD PRESSURE: 80 MMHG | WEIGHT: 153.3 LBS | BODY MASS INDEX: 28.21 KG/M2 | HEIGHT: 62 IN

## 2025-05-02 DIAGNOSIS — R00.0 TACHYCARDIA: Primary | ICD-10-CM

## 2025-05-02 ASSESSMENT — ENCOUNTER SYMPTOMS: NAUSEA: 1

## 2025-05-02 NOTE — PROGRESS NOTES
Melisa Lopez (:  2000) is a 25 y.o. female,Established patient, here for evaluation of the following chief complaint(s):  elevated heart rate (Elevated heart rate this morning was 140. She has dizziness, nausea and fatigue )      ASSESSMENT/PLAN:  1. Tachycardia  -keep well hydration       Return if symptoms worsen or fail to improve.    SUBJECTIVE/OBJECTIVE:  Present to clinic as had nausea,fatigue,dizziness. Her hear rate was elevated 140 earlier at work.had a history of tachycardia and take beta blocker.follow up with er doctor. Feels better now.      History provided by:  Patient      Vitals:    25 1247   BP: 115/80   BP Site: Right Upper Arm   Patient Position: Sitting   BP Cuff Size: Medium Adult   Pulse: 80   Temp: 97.9 °F (36.6 °C)   TempSrc: Oral   SpO2: 98%   Weight: 69.5 kg (153 lb 4.8 oz)   Height: 1.575 m (5' 2\")       Review of Systems   Constitutional:  Positive for fatigue.   Gastrointestinal:  Positive for nausea.   Neurological:  Positive for dizziness.       Physical Exam  Constitutional:       Appearance: Normal appearance.   HENT:      Head: Normocephalic and atraumatic.      Nose: Nose normal.      Mouth/Throat:      Mouth: Mucous membranes are moist.   Eyes:      Pupils: Pupils are equal, round, and reactive to light.   Pulmonary:      Effort: Pulmonary effort is normal.      Breath sounds: Normal breath sounds.   Musculoskeletal:         General: Normal range of motion.      Cervical back: Normal range of motion and neck supple.   Skin:     General: Skin is warm.   Neurological:      Mental Status: She is alert and oriented to person, place, and time.   Psychiatric:         Mood and Affect: Mood normal.         Behavior: Behavior normal.           An electronic signature was used to authenticate this note.    --Beth Covarrubias DO

## 2025-06-05 ENCOUNTER — PATIENT MESSAGE (OUTPATIENT)
Dept: INTERNAL MEDICINE CLINIC | Age: 25
End: 2025-06-05

## 2025-06-05 RX ORDER — VENLAFAXINE HYDROCHLORIDE 150 MG/1
150 CAPSULE, EXTENDED RELEASE ORAL DAILY
Qty: 30 CAPSULE | Refills: 0 | Status: SHIPPED | OUTPATIENT
Start: 2025-06-05

## 2025-06-06 NOTE — PROGRESS NOTES
bruit    Peripheral pulses are symmetrical and full  There is no clubbing, cyanosis of the extremities.  No edema  Femoral Arteries: 2+ and equal  Pedal Pulses: 2+ and equal   Abdomen:  No masses or tenderness  Liver/Spleen: No Abnormalities Noted  Neurological/Psychiatric:  Alert and oriented in all spheres  Moves all extremities well  Exhibits normal gait balance and coordination  No abnormalities of mood, affect, memory, mentation, or behavior are noted    Echo: 8/22/2014  Structurally normal heart  Normal chamber sizes  Normal right ventricular and left ventricular systolic performance  Normal valve function    48 holter monitor: 6/2024  Indication: Tachycardia, palpitations  Tech comments: 1. The rhythm was sinus. Average AZ interval 0.16 average QRS duration 0.08. Average heart rate 101 ranging from 71 to 170 bpm. Sinus tachycardia 50% test duration. 2. Diary returned with entries \"shaky\" and \"slightly dizzy\".      Assessment/Plan:    1. Palpitations    2. Dizziness    3. Fatigue, unspecified type    4. Tachycardia          Palpitations/Tachycardia  - Initiated in 2014 6/2024: 48 hour holter monitor > SR, Average heart rate 101 ranging from 71 - 170 bpm.  ST 50% duration of test.  Symptoms of \"shaky\" and \"slightly dizzy.\"      - 6/24/24 - Propranolol 60 mg daily - Prescribed by PCP   - 7/25/25 - COVID 19  - 7/29/24 - Meclizine 25 mg TID PRN was prescribed for dizziness   - 12/26/24 - Positive orthostatic blood pressures and pulse in ED    - 2/5/25 > Na 139, K+ 4.8, BUN 9, Creat 0.7, TSH 0.95    - EKG: Today (6/12/25) - Personally interpreted > normal         - Resting heart rate in 70's with it increasing to 110-120 upon standing and walking  - Reports associated lightheadedness, dizziness, presyncope, fatigue  - Resting heart rate in 70's with it increasing to 110-120 upon standing and walking  - Wears compression stockings, has recently increased hydration and

## 2025-06-12 ENCOUNTER — TELEPHONE (OUTPATIENT)
Dept: CARDIOLOGY CLINIC | Age: 25
End: 2025-06-12

## 2025-06-12 ENCOUNTER — ANCILLARY PROCEDURE (OUTPATIENT)
Dept: CARDIOLOGY CLINIC | Age: 25
End: 2025-06-12

## 2025-06-12 ENCOUNTER — OFFICE VISIT (OUTPATIENT)
Dept: CARDIOLOGY CLINIC | Age: 25
End: 2025-06-12
Payer: COMMERCIAL

## 2025-06-12 VITALS
DIASTOLIC BLOOD PRESSURE: 62 MMHG | BODY MASS INDEX: 28.23 KG/M2 | HEART RATE: 81 BPM | WEIGHT: 153.4 LBS | SYSTOLIC BLOOD PRESSURE: 114 MMHG | OXYGEN SATURATION: 98 % | HEIGHT: 62 IN

## 2025-06-12 DIAGNOSIS — R53.83 FATIGUE, UNSPECIFIED TYPE: ICD-10-CM

## 2025-06-12 DIAGNOSIS — R00.0 TACHYCARDIA: ICD-10-CM

## 2025-06-12 DIAGNOSIS — R42 DIZZINESS: ICD-10-CM

## 2025-06-12 DIAGNOSIS — R55 SYNCOPE, UNSPECIFIED SYNCOPE TYPE: Primary | ICD-10-CM

## 2025-06-12 DIAGNOSIS — R00.2 PALPITATIONS: Primary | ICD-10-CM

## 2025-06-12 DIAGNOSIS — R00.2 PALPITATIONS: ICD-10-CM

## 2025-06-12 LAB — ECHO BSA: 1.74 M2

## 2025-06-12 PROCEDURE — 93000 ELECTROCARDIOGRAM COMPLETE: CPT | Performed by: INTERNAL MEDICINE

## 2025-06-12 PROCEDURE — 99204 OFFICE O/P NEW MOD 45 MIN: CPT | Performed by: INTERNAL MEDICINE

## 2025-06-12 NOTE — TELEPHONE ENCOUNTER
Instructions for your Tilt-Table Study:   Bring a list of your medications.  Do not eat or drink anything for 4 hours prior to the procedure.  Take all morning medications the day of the procedure with a small amount of water.  Discharge instructions will be given to you at the time of your procedure.

## 2025-06-12 NOTE — PATIENT INSTRUCTIONS
Stop Inderal - Take it every other day for 2 weeks and then stop taking it.      7 day heart monitor - Placed in office today - Will call you with the results    Echocardiogram soon     Exercise stress test after you have tapered off the Inderal     Will reach out to you to get a tilt table test scheduled    Recommend reading the book - The Anxiety Disease by Darrel Monson MD    Continue compression stockings and increasing hydration with electrolytes      Follow up with Dr. Sigala in 1 month

## 2025-06-12 NOTE — NURSING NOTE
7 day monitor placed on the patient here in clinic today . Reviewed proper care and instructions with the patient here in clinic.     SN : [id number]

## 2025-06-12 NOTE — TELEPHONE ENCOUNTER
----- Message from ELIJAH ARTEAGA RN sent at 6/12/2025 11:04 AM EDT -----  LINO requested for a message to be sent to EP for tilt table test scheduling.      Thanks,   ELIJAH Domínguez

## 2025-06-20 PROBLEM — R55 SYNCOPE: Status: ACTIVE | Noted: 2025-06-12

## 2025-06-20 NOTE — TELEPHONE ENCOUNTER
Procedure: TTS    Date Of Procedure:  7/18/25    Time Of Arrival: 1230PM    Procedure Time: 130PM       Called and spoke to patient and she is agreeable to the date and time. Reviewed the Pre-Procedure instructions and they verbalized understanding. Encouraged to call with any questions or concerns.       Published on [employer] / emailed to Cath lab / note in chart / scheduled in Epic/Cupid/Carto

## 2025-06-24 LAB — ECHO BSA: 1.74 M2

## 2025-07-01 ENCOUNTER — RESULTS FOLLOW-UP (OUTPATIENT)
Dept: CARDIOLOGY | Age: 25
End: 2025-07-01

## 2025-07-03 ENCOUNTER — PATIENT MESSAGE (OUTPATIENT)
Dept: CARDIOLOGY CLINIC | Age: 25
End: 2025-07-03

## 2025-07-07 RX ORDER — VENLAFAXINE HYDROCHLORIDE 150 MG/1
CAPSULE, EXTENDED RELEASE ORAL DAILY
Qty: 30 CAPSULE | Refills: 0 | OUTPATIENT
Start: 2025-07-07

## 2025-07-18 ENCOUNTER — HOSPITAL ENCOUNTER (OUTPATIENT)
Age: 25
Setting detail: OUTPATIENT SURGERY
Discharge: HOME | End: 2025-07-18
Attending: INTERNAL MEDICINE | Admitting: INTERNAL MEDICINE
Payer: COMMERCIAL

## 2025-07-18 VITALS
HEIGHT: 62 IN | HEART RATE: 92 BPM | BODY MASS INDEX: 27.6 KG/M2 | RESPIRATION RATE: 13 BRPM | SYSTOLIC BLOOD PRESSURE: 111 MMHG | WEIGHT: 150 LBS | TEMPERATURE: 98 F | DIASTOLIC BLOOD PRESSURE: 74 MMHG | OXYGEN SATURATION: 100 %

## 2025-07-18 DIAGNOSIS — R55 SYNCOPE: ICD-10-CM

## 2025-07-18 PROCEDURE — 7100000010 HC PHASE II RECOVERY - FIRST 15 MIN: Performed by: INTERNAL MEDICINE

## 2025-07-18 PROCEDURE — 2709999900 HC NON-CHARGEABLE SUPPLY: Performed by: INTERNAL MEDICINE

## 2025-07-18 PROCEDURE — 93660 TILT TABLE EVALUATION: CPT | Performed by: INTERNAL MEDICINE

## 2025-07-18 PROCEDURE — 7100000011 HC PHASE II RECOVERY - ADDTL 15 MIN: Performed by: INTERNAL MEDICINE

## 2025-07-18 NOTE — PROCEDURES
PROCEDURE NOTE  Date: 7/18/2025   Name: Melisa Lopez  YOB: 2000    Procedures    [facility]     Electrophysiology Procedure Note       Date of Procedure: 7/18/2025  Patient's Name: Melisa Lopez  YOB: 2000   Medical Record Number: [id number]  Procedure Performed by: Jonathan Hua MD,     Procedures performed:  Tilt table testing and administration of sublingual nitroglycerin     Indication of the procedure:  Melisa Lopez is a 25 y.o. female presented with syncope.     Details of procedure:    Procedure's risks, benefits and alternatives of procedure were explained to patient. All questions were answered. Patient understood and informed consent was obtained.The patient was brought to the electrophysiology lab in a fasting nonsedated state. Peripheral IV access was obtained and he was put on the tilt table test.    Initial vital signs at baseline supine:  BP: 120/78 HR: 95     Then the tilt table was raised to 70 degree.   Immediately upon raising the table the vitals were:   BP: 119/86 HR: 102    Duration of procedure the maximum heart rate was 159 and the blood pressure ranged from 122/86 to 160/113.    Patient had symptoms of dizziness and lightheadedness palpitation.  Patient said that she did not feel well.  At no point she had any significant drop in the blood pressure or heart rate although there was fluctuation of the heart rate.    At the end of procedure:  BP: 114/70 HR: 87    The patient tolerated the procedure well and there were no complications.    Conclusion:      Negative tilt table test.

## 2025-07-18 NOTE — DISCHARGE INSTRUCTIONS
TILT TABLE DISCHARGE INSTRUCTIONS    Continue Medications.    Increase fluid intake 6- 8 glasses of water everday.    Wear compression stockings as directed.    Follow up appointment as needed.

## 2025-07-18 NOTE — H&P
[facility]     Chief Complaint   Patient presents with    New Patient    Palpitations    Fatigue    Dizziness    Tachycardia        History of Present Illness:  Melisa Lopez is a 25 y.o. female who is here for cardiac consultation.  Melisa was referred by her PCP, EMY Conner for evaluation/management of palpitations, brain fog, chronic fatigue, vertigo and tingling.  Melisa has a past medical history of asthma, GILL, adjustment disorder with mixed anxiety and depressed mood, ADHD, PTSD, iron deficiency anemia and syncope (8/2014).        4/13/24 - ER visit with complaints of dizziness and palpitations.  She had reported at that time that she had previously been worked up for syncopal episodes and that she had a workup in her early childhood and reported that it was essentially negative.  Reported associated fatigue and lightheadedness with standing.  Workup negative and heart rate normalized.      12/27/24 - Presented to ED with complaints of lightheadedness and dizziness sensation while sitting at work earlier that day with associated nausea and had to lay down on the floor.  Reported that she had taken a Meclizine with some improvement of symptoms.  Reported that she had not taken her Propranolol the previous night.  Notation that orthostatic blood pressures and pulse were positive.  She was given IV fluids and Zofran and reported that her symptoms significantly improved.      4/24/25 - Follow up with PCP - Reported intermittent hand edema with occasional color changes during episodes and occurs after standing for 30 minutes, an episode of isolated edema to middle finger of left hand.  Notation of resting heart rates in the 70's with it increasing to between 110-120 upon standing and walking.  Reported tingling in bilateral upper and lower extremities with minimal relief upon changing positions, less noticeable during movement but present with standing, occasional tingling in her    Constitutional: there has been no unanticipated weight loss. There's been no change in energy level, sleep pattern, or activity level.     Eyes: No visual changes or diplopia. No scleral icterus.  ENT: No Headaches, hearing loss or vertigo. No mouth sores or sore throat.  Cardiovascular: Reviewed in HPI  Respiratory: No cough or wheezing, no sputum production. No hematemesis.    Gastrointestinal: No abdominal pain, appetite loss, blood in stools. No change in bowel or bladder habits.  Genitourinary: No dysuria, trouble voiding, or hematuria.  Musculoskeletal:  No gait disturbance, weakness or joint complaints.  Integumentary: No rash or pruritis.  Neurological: No headache, diplopia, change in muscle strength, numbness or tingling. No change in gait, balance, coordination, mood, affect, memory, mentation, behavior.  Psychiatric: No anxiety, no depression.  Endocrine: No malaise, fatigue or temperature intolerance. No excessive thirst, fluid intake, or urination. No tremor.  Hematologic/Lymphatic: No abnormal bruising or bleeding, blood clots or swollen lymph nodes.  Allergic/Immunologic: No nasal congestion or hives.    Physical Examination:    Vitals:    06/12/25 1006   BP: 114/62   Pulse: 81   SpO2: 98%        Wt Readings from Last 1 Encounters:   06/12/25 69.6 kg (153 lb 6.4 oz)     Very mild drop in BP when she stand with significant increase in Heart rate to 110. Complained of nmbness in her legs  Constitutional and General Appearance: NAD  Skin:good turgor,intact without lesions  HEENT: EOMI ,normal  Neck:no JVD     Respiratory:  Normal excursion and expansion without use of accessory muscles  Resp Auscultation: Normal breath sounds without dullness  Cardiovascular:  The apical impulses not displaced  Heart tones are crisp and normal  Cervical veins are not engorged  The carotid upstroke is normal in amplitude and contour without delay or bruit    Peripheral pulses are symmetrical and full  There is no

## 2025-07-20 LAB — ECHO BSA: 1.73 M2

## 2025-07-21 ENCOUNTER — PATIENT MESSAGE (OUTPATIENT)
Dept: CARDIOLOGY CLINIC | Age: 25
End: 2025-07-21

## 2025-07-22 LAB — ECHO BSA: 1.73 M2

## 2025-07-22 NOTE — PROGRESS NOTES
clearly does not drink enough fluid and has frequent dizziness.  Symptoms improved dramatically with increasing hydration    - Orthostatic BP's personally checked on 6/12/25 -   Laying - 106/60   Sitting - 102/60 with complaints of feeling a little dizzy with sitting up  Standing - 110/70, with increased heart rate and worsening dizziness with standing longer  - She was laid back down with complaints of her feet feeling numb    Iron Deficiency Anemia, history of  - 10/24/24 > Hgb 12.6, Hct 38.1, Iron 38, TIBC 417, Iron % Sat 9, Ferritin 6.7 - Recommend to re-start oral iron supplements at that time.    - 2/5/25 > Hgb 12.3, Hct 38.0      Adjustment Disorder with Mixed Anxiety and Depressed Mood  - Previously followed with Psychiatry  - Effexor 150 mg daily   - Managed by PCP       Plan:   7/24/25  Cardiac test and lab results personally reviewed by me during this office visit and discussed.       Medication changes today -   - Re-start Inderal LA 60 mg daily  - Start Lopressor 25 mg every 12 hours as needed for racing heart    - Advised to keep track of how often that she is needing to take the Lopressor and to call office if she is needing to take Lopressor every other day to see if Inderal can be increased to 120 mg     Continue risk factor modifications. She has inappropriate sinus tachycardia  Call for any change in symptoms, call to report any changes in shortness of breath or development of chest pain with activity.  Follow up in 2 months - consider EP referral         I appreciate the opportunity of cooperating in the care of this individual.    Len Sigala M.D., [facility]    Patient's problem list, medications, allergies, past medical, surgical, social and family histories were reviewed and updated as appropriate.    Scribe's attestation: This note was scribed in the presence of Dr. Zakiya MD by Catrachita Aguilar RN    The scribe's documentation has been prepared under my direction and personally reviewed by me in

## 2025-07-24 ENCOUNTER — OFFICE VISIT (OUTPATIENT)
Dept: CARDIOLOGY CLINIC | Age: 25
End: 2025-07-24
Payer: COMMERCIAL

## 2025-07-24 VITALS
WEIGHT: 154.3 LBS | HEIGHT: 62 IN | DIASTOLIC BLOOD PRESSURE: 80 MMHG | HEART RATE: 85 BPM | BODY MASS INDEX: 28.39 KG/M2 | SYSTOLIC BLOOD PRESSURE: 112 MMHG | OXYGEN SATURATION: 99 %

## 2025-07-24 DIAGNOSIS — R00.0 TACHYCARDIA: Primary | ICD-10-CM

## 2025-07-24 PROCEDURE — 99214 OFFICE O/P EST MOD 30 MIN: CPT | Performed by: INTERNAL MEDICINE

## 2025-07-24 RX ORDER — PROPRANOLOL HYDROCHLORIDE 60 MG/1
60 CAPSULE, EXTENDED RELEASE ORAL DAILY
Qty: 30 CAPSULE | Refills: 1 | Status: SHIPPED | OUTPATIENT
Start: 2025-07-24

## 2025-07-24 RX ORDER — METOPROLOL TARTRATE 25 MG/1
TABLET, FILM COATED ORAL
Qty: 60 TABLET | Refills: 1 | Status: SHIPPED | OUTPATIENT
Start: 2025-07-24

## 2025-07-24 NOTE — PATIENT INSTRUCTIONS
Start Inderal LA 60 mg daily    Start Lopressor 25 mg every 12 hours as needed - Keep track of how often that you are needing to take the Lopressor and bring that with you to your next appointment.      - Call office if you are needing to use the Lopressor every other day to see if Inderal can be increased to 120 mg.      Follow up with Dr. Sigala in 2 months

## 2025-08-05 ENCOUNTER — OFFICE VISIT (OUTPATIENT)
Dept: INTERNAL MEDICINE CLINIC | Age: 25
End: 2025-08-05
Payer: COMMERCIAL

## 2025-08-05 VITALS
BODY MASS INDEX: 28.82 KG/M2 | OXYGEN SATURATION: 97 % | SYSTOLIC BLOOD PRESSURE: 108 MMHG | DIASTOLIC BLOOD PRESSURE: 80 MMHG | HEART RATE: 86 BPM | HEIGHT: 62 IN | WEIGHT: 156.6 LBS

## 2025-08-05 DIAGNOSIS — R53.82 CHRONIC FATIGUE: ICD-10-CM

## 2025-08-05 DIAGNOSIS — F43.23 ADJUSTMENT DISORDER WITH MIXED ANXIETY AND DEPRESSED MOOD: ICD-10-CM

## 2025-08-05 DIAGNOSIS — R00.0 TACHYCARDIA: Primary | ICD-10-CM

## 2025-08-05 DIAGNOSIS — K90.9 MALABSORPTION OF IRON: ICD-10-CM

## 2025-08-05 DIAGNOSIS — F43.10 PTSD (POST-TRAUMATIC STRESS DISORDER): ICD-10-CM

## 2025-08-05 DIAGNOSIS — R41.89 BRAIN FOG: ICD-10-CM

## 2025-08-05 DIAGNOSIS — J45.20 MILD INTERMITTENT ASTHMA WITHOUT COMPLICATION: ICD-10-CM

## 2025-08-05 DIAGNOSIS — F41.1 GENERALIZED ANXIETY DISORDER: ICD-10-CM

## 2025-08-05 DIAGNOSIS — R00.2 PALPITATIONS: ICD-10-CM

## 2025-08-05 DIAGNOSIS — R42 VERTIGO: ICD-10-CM

## 2025-08-05 DIAGNOSIS — D50.8 IRON DEFICIENCY ANEMIA SECONDARY TO INADEQUATE DIETARY IRON INTAKE: ICD-10-CM

## 2025-08-05 DIAGNOSIS — F33.42 RECURRENT MAJOR DEPRESSIVE DISORDER, IN FULL REMISSION: ICD-10-CM

## 2025-08-05 PROCEDURE — G2211 COMPLEX E/M VISIT ADD ON: HCPCS | Performed by: NURSE PRACTITIONER

## 2025-08-05 PROCEDURE — 99080 SPECIAL REPORTS OR FORMS: CPT | Performed by: NURSE PRACTITIONER

## 2025-08-05 PROCEDURE — 99215 OFFICE O/P EST HI 40 MIN: CPT | Performed by: NURSE PRACTITIONER

## (undated) DEVICE — PROBE COVER KIT: Brand: MEDLINE INDUSTRIES, INC.

## (undated) DEVICE — 3M™ RED DOT™ REPOSITIONABLE MONITORING ELECTRODE 2670-5, 5/BAG, 200/CASE, 54/PLT: Brand: RED DOT™

## (undated) DEVICE — CATH LAB PACK: Brand: MEDLINE INDUSTRIES, INC.

## (undated) DEVICE — ELECTRODE PT RET AD L9FT HI MOIST COND ADH HYDRGEL CORDED

## (undated) DEVICE — PAD, DEFIB, ADULT, RADIOTRANS, PHYSIO: Brand: MEDLINE